# Patient Record
Sex: FEMALE | Race: WHITE | NOT HISPANIC OR LATINO | URBAN - METROPOLITAN AREA
[De-identification: names, ages, dates, MRNs, and addresses within clinical notes are randomized per-mention and may not be internally consistent; named-entity substitution may affect disease eponyms.]

---

## 2021-06-09 ENCOUNTER — OFFICE VISIT (OUTPATIENT)
Dept: SLEEP MEDICINE | Facility: CLINIC | Age: 51
End: 2021-06-09
Payer: OTHER GOVERNMENT

## 2021-06-09 VITALS
HEART RATE: 70 BPM | SYSTOLIC BLOOD PRESSURE: 118 MMHG | DIASTOLIC BLOOD PRESSURE: 73 MMHG | HEIGHT: 61 IN | BODY MASS INDEX: 26.22 KG/M2 | WEIGHT: 138.88 LBS

## 2021-06-09 DIAGNOSIS — R06.83 SNORING: ICD-10-CM

## 2021-06-09 DIAGNOSIS — G47.10 HYPERSOMNIA: Primary | ICD-10-CM

## 2021-06-09 DIAGNOSIS — G47.30 SLEEP APNEA, UNSPECIFIED TYPE: ICD-10-CM

## 2021-06-09 PROCEDURE — 99999 PR PBB SHADOW E&M-NEW PATIENT-LVL III: ICD-10-PCS | Mod: PBBFAC,,, | Performed by: INTERNAL MEDICINE

## 2021-06-09 PROCEDURE — 99999 PR PBB SHADOW E&M-NEW PATIENT-LVL III: CPT | Mod: PBBFAC,,, | Performed by: INTERNAL MEDICINE

## 2021-06-09 PROCEDURE — 99203 OFFICE O/P NEW LOW 30 MIN: CPT | Mod: S$PBB,,, | Performed by: INTERNAL MEDICINE

## 2021-06-09 PROCEDURE — 99203 OFFICE O/P NEW LOW 30 MIN: CPT | Mod: PBBFAC | Performed by: INTERNAL MEDICINE

## 2021-06-09 PROCEDURE — 99203 PR OFFICE/OUTPT VISIT, NEW, LEVL III, 30-44 MIN: ICD-10-PCS | Mod: S$PBB,,, | Performed by: INTERNAL MEDICINE

## 2021-06-09 RX ORDER — VALACYCLOVIR HYDROCHLORIDE 1 G/1
4000 TABLET, FILM COATED ORAL ONCE
COMMUNITY
Start: 2021-05-27

## 2021-06-17 ENCOUNTER — PATIENT MESSAGE (OUTPATIENT)
Dept: SLEEP MEDICINE | Facility: CLINIC | Age: 51
End: 2021-06-17

## 2021-06-28 ENCOUNTER — TELEPHONE (OUTPATIENT)
Dept: SLEEP MEDICINE | Facility: OTHER | Age: 51
End: 2021-06-28

## 2021-07-20 ENCOUNTER — TELEPHONE (OUTPATIENT)
Dept: SLEEP MEDICINE | Facility: OTHER | Age: 51
End: 2021-07-20

## 2021-07-21 ENCOUNTER — HOSPITAL ENCOUNTER (OUTPATIENT)
Dept: SLEEP MEDICINE | Facility: OTHER | Age: 51
Discharge: HOME | End: 2021-07-21
Attending: INTERNAL MEDICINE
Payer: OTHER GOVERNMENT

## 2021-07-21 DIAGNOSIS — G47.30 SLEEP APNEA, UNSPECIFIED TYPE: ICD-10-CM

## 2021-07-21 DIAGNOSIS — G47.10 HYPERSOMNIA: ICD-10-CM

## 2021-07-21 DIAGNOSIS — G47.33 OSA (OBSTRUCTIVE SLEEP APNEA): Primary | ICD-10-CM

## 2021-07-21 DIAGNOSIS — R06.83 SNORING: ICD-10-CM

## 2021-07-21 PROCEDURE — 95810 POLYSOM 6/> YRS 4/> PARAM: CPT | Mod: 26,,, | Performed by: INTERNAL MEDICINE

## 2021-07-21 PROCEDURE — 95810 POLYSOM 6/> YRS 4/> PARAM: CPT

## 2021-07-21 PROCEDURE — 95810 PR POLYSOMNOGRAPHY, 4 OR MORE: ICD-10-PCS | Mod: 26,,, | Performed by: INTERNAL MEDICINE

## 2021-07-28 ENCOUNTER — PATIENT MESSAGE (OUTPATIENT)
Dept: SLEEP MEDICINE | Facility: CLINIC | Age: 51
End: 2021-07-28

## 2021-07-28 DIAGNOSIS — G47.10 HYPERSOMNIA: ICD-10-CM

## 2021-07-28 DIAGNOSIS — G47.33 OSA (OBSTRUCTIVE SLEEP APNEA): Primary | ICD-10-CM

## 2021-07-29 ENCOUNTER — PATIENT MESSAGE (OUTPATIENT)
Dept: SLEEP MEDICINE | Facility: CLINIC | Age: 51
End: 2021-07-29

## 2021-08-12 ENCOUNTER — PATIENT MESSAGE (OUTPATIENT)
Dept: SLEEP MEDICINE | Facility: CLINIC | Age: 51
End: 2021-08-12

## 2021-09-17 ENCOUNTER — PATIENT MESSAGE (OUTPATIENT)
Dept: SLEEP MEDICINE | Facility: CLINIC | Age: 51
End: 2021-09-17

## 2021-10-08 ENCOUNTER — PATIENT MESSAGE (OUTPATIENT)
Dept: SLEEP MEDICINE | Facility: CLINIC | Age: 51
End: 2021-10-08

## 2021-10-08 DIAGNOSIS — G47.33 OSA (OBSTRUCTIVE SLEEP APNEA): Primary | ICD-10-CM

## 2021-12-06 ENCOUNTER — PATIENT MESSAGE (OUTPATIENT)
Dept: SLEEP MEDICINE | Facility: CLINIC | Age: 51
End: 2021-12-06
Payer: OTHER GOVERNMENT

## 2021-12-06 ENCOUNTER — TELEPHONE (OUTPATIENT)
Dept: SLEEP MEDICINE | Facility: CLINIC | Age: 51
End: 2021-12-06
Payer: OTHER GOVERNMENT

## 2021-12-15 ENCOUNTER — OFFICE VISIT (OUTPATIENT)
Dept: OTOLARYNGOLOGY | Facility: CLINIC | Age: 51
End: 2021-12-15
Payer: OTHER GOVERNMENT

## 2021-12-15 ENCOUNTER — CLINICAL SUPPORT (OUTPATIENT)
Dept: AUDIOLOGY | Facility: CLINIC | Age: 51
End: 2021-12-15
Payer: OTHER GOVERNMENT

## 2021-12-15 VITALS — SYSTOLIC BLOOD PRESSURE: 107 MMHG | DIASTOLIC BLOOD PRESSURE: 68 MMHG | HEART RATE: 72 BPM

## 2021-12-15 DIAGNOSIS — J35.8 TONSILLITH: ICD-10-CM

## 2021-12-15 DIAGNOSIS — J31.0 OTHER RHINITIS: Primary | ICD-10-CM

## 2021-12-15 DIAGNOSIS — H93.293 ABNORMAL AUDITORY PERCEPTION OF BOTH EARS: Primary | ICD-10-CM

## 2021-12-15 DIAGNOSIS — H93.13 TINNITUS OF BOTH EARS: ICD-10-CM

## 2021-12-15 DIAGNOSIS — R09.81 NASAL CONGESTION: ICD-10-CM

## 2021-12-15 DIAGNOSIS — H69.93 EUSTACHIAN TUBE DYSFUNCTION, BILATERAL: ICD-10-CM

## 2021-12-15 DIAGNOSIS — G47.33 OSA (OBSTRUCTIVE SLEEP APNEA): ICD-10-CM

## 2021-12-15 PROCEDURE — 92567 TYMPANOMETRY: CPT | Mod: PBBFAC | Performed by: AUDIOLOGIST

## 2021-12-15 PROCEDURE — 99204 OFFICE O/P NEW MOD 45 MIN: CPT | Mod: S$PBB,,, | Performed by: OTOLARYNGOLOGY

## 2021-12-15 PROCEDURE — 99999 PR PBB SHADOW E&M-EST. PATIENT-LVL III: CPT | Mod: PBBFAC,,, | Performed by: OTOLARYNGOLOGY

## 2021-12-15 PROCEDURE — 99204 PR OFFICE/OUTPT VISIT, NEW, LEVL IV, 45-59 MIN: ICD-10-PCS | Mod: S$PBB,,, | Performed by: OTOLARYNGOLOGY

## 2021-12-15 PROCEDURE — 92557 COMPREHENSIVE HEARING TEST: CPT | Mod: PBBFAC | Performed by: AUDIOLOGIST

## 2021-12-15 PROCEDURE — 99999 PR PBB SHADOW E&M-EST. PATIENT-LVL III: ICD-10-PCS | Mod: PBBFAC,,, | Performed by: OTOLARYNGOLOGY

## 2021-12-15 PROCEDURE — 99213 OFFICE O/P EST LOW 20 MIN: CPT | Mod: PBBFAC | Performed by: OTOLARYNGOLOGY

## 2021-12-15 RX ORDER — FLUTICASONE PROPIONATE 50 MCG
2 SPRAY, SUSPENSION (ML) NASAL DAILY
Qty: 9.9 ML | Refills: 2 | Status: SHIPPED | OUTPATIENT
Start: 2021-12-15 | End: 2022-01-14

## 2021-12-15 RX ORDER — AZELASTINE 1 MG/ML
2 SPRAY, METERED NASAL 2 TIMES DAILY
Qty: 30 ML | Refills: 3 | Status: SHIPPED | OUTPATIENT
Start: 2021-12-15 | End: 2022-12-15

## 2021-12-20 ENCOUNTER — TELEPHONE (OUTPATIENT)
Dept: SLEEP MEDICINE | Facility: CLINIC | Age: 51
End: 2021-12-20
Payer: OTHER GOVERNMENT

## 2021-12-22 ENCOUNTER — OFFICE VISIT (OUTPATIENT)
Dept: SLEEP MEDICINE | Facility: CLINIC | Age: 51
End: 2021-12-22
Payer: OTHER GOVERNMENT

## 2021-12-22 DIAGNOSIS — G47.10 HYPERSOMNIA: ICD-10-CM

## 2021-12-22 DIAGNOSIS — G47.33 OSA (OBSTRUCTIVE SLEEP APNEA): Primary | ICD-10-CM

## 2021-12-22 PROCEDURE — 99212 OFFICE O/P EST SF 10 MIN: CPT | Mod: 95,,, | Performed by: INTERNAL MEDICINE

## 2021-12-22 PROCEDURE — 99212 PR OFFICE/OUTPT VISIT, EST, LEVL II, 10-19 MIN: ICD-10-PCS | Mod: 95,,, | Performed by: INTERNAL MEDICINE

## 2022-10-24 ENCOUNTER — OFFICE VISIT (OUTPATIENT)
Dept: ALLERGY | Facility: CLINIC | Age: 52
End: 2022-10-24
Payer: OTHER GOVERNMENT

## 2022-10-24 VITALS
WEIGHT: 141.19 LBS | DIASTOLIC BLOOD PRESSURE: 76 MMHG | SYSTOLIC BLOOD PRESSURE: 117 MMHG | BODY MASS INDEX: 26.66 KG/M2 | OXYGEN SATURATION: 96 % | TEMPERATURE: 98 F | HEART RATE: 69 BPM | HEIGHT: 61 IN

## 2022-10-24 DIAGNOSIS — J32.9 RHINOSINUSITIS: Primary | ICD-10-CM

## 2022-10-24 PROCEDURE — 99243 OFF/OP CNSLTJ NEW/EST LOW 30: CPT | Mod: S$GLB,,, | Performed by: ALLERGY & IMMUNOLOGY

## 2022-10-24 PROCEDURE — 99243 PR OFFICE CONSULTATION,LEVEL III: ICD-10-PCS | Mod: S$GLB,,, | Performed by: ALLERGY & IMMUNOLOGY

## 2022-10-24 RX ORDER — BUDESONIDE 0.5 MG/2ML
0.5 INHALANT ORAL DAILY
Qty: 120 ML | Refills: 3 | Status: SHIPPED | OUTPATIENT
Start: 2022-10-24 | End: 2023-10-24

## 2022-10-24 RX ORDER — OLOPATADINE HYDROCHLORIDE 1 MG/ML
SOLUTION/ DROPS OPHTHALMIC
COMMUNITY
Start: 2022-08-30

## 2022-10-24 RX ORDER — IBUPROFEN 800 MG/1
800 TABLET ORAL 3 TIMES DAILY
COMMUNITY
Start: 2022-05-06

## 2022-10-24 NOTE — PROGRESS NOTES
"Subjective:       Patient ID: Johana Lennon is a 51 y.o. female.    Chief Complaint: Allergic Rhinitis     HPI    Pt presents as   A new patient  For rhinitis     Onset: adulthood   Duration 25 yrs  Last allergy test 2012 - negative  Sx: watery, itching eyes, congestion, pnd, azelazstine - doesn't use.   Tx: pataday, saline, h1 otc, gargle salt water    Season: none  Trigger: wind, weather changes       Review of Systems    General: neg unexpected weight changes, fevers, chills, night sweats, malaise  HEENT: see hpi, Neg eye pain, vision changes, ear drainage, nose bleeds, throat tightness, sores in the mouth  CV: Neg chest pain, palpitations, swelling  Resp: see hpi, neg shortness of breath, hemoptysis, cough  GI: see hpi, neg dysphagia, night abdominal pain, reflux, chronic diarrhea, chronic constipation  Derm: See Hpi, neg new rash, neg flushing  Mu/sk: Neg joint pain, joint swelling   Psych: Neg anxiety  neuro: neg chronic headaches, muscle weakness  Endo: neg heat/cold intolerance, chronic fatigue    Objective:     Vitals:    10/24/22 1042   BP: 117/76   Pulse: 69   Temp: 97.9 °F (36.6 °C)   SpO2: 96%   Weight: 64 kg (141 lb 3.2 oz)   Height: 5' 1" (1.549 m)        Physical Exam    General: no acute distress, well developed well nourished   HEENT:   Head:normocephalic atraumatic  Eyes: YULY, EOMI, Neg injection, scleral icterus, or conjunctival papillary hypertrophy.  Ears: tm clear bilaterally, normal canal  Nose: 2-3+ inferior turbinates pink, neg nasal polyps            Mucosa: moist             Septal irritation: none   OP: mucus membranes moist, - cobblestoning, - PND, neg erythema or lesions  Neck: supple, Full range of motion, neg lymphadenopathy  Chest: full respiratory excursion no abnormal chest abnormality  Skin: Neg rashes or lesions      Assessment:       1. Rhinosinusitis        Plan:       Rhinosinusitis  -     budesonide (PULMICORT) 0.5 mg/2 mL nebulizer solution; Take 2 mLs (0.5 mg total) by " nebulization once daily. Controller  Dispense: 120 mL; Refill: 3          Rhinosinusitis  10/22:  Start budesonide into sinus rinses   Start alkolol prn   Start capsaicine nasal spray prn   Skin prick test inhalants    Bilateral conjunctivitis   10/22:  Continue pataday prn     Follow up in 4-6 weeks, sooner if needed      Diana Perry M.D.  Allergy/Immunology  [facility]   [phone] phone  [phone] fax

## 2022-10-24 NOTE — PATIENT INSTRUCTIONS
[employer] is the easiest may be about 15-18 dollars       Alkolol  Ingredients  Purified Water USP, Menthol, Eucalyptol, Thymol, Camphor, Benzoin, Wintergreen Oil, Spearmint (Mentha Viridis) Oil, Pine Oil, Cinnamon Oil, Potassium Alum, Potassium Chlorate, Sodium Bicarbonate (Baking Soda), Sodium Chloride, Alcohol, Caramel Color            Put distilled water into the estefani med sinus rinse bottle and stop at the black line. Then pour that amount into a microwavable mug, microwave 10 seconds then put into the distilled water back into the bottle.   Then place xlear packet into and mix. The packets that come with the estefani med can be used instead or kept for backup if you run out of xlear.      - over the counter, can buy online at [employer].        Then place budesonide- respules into mixture, this will replace the intranasal steroid spray. (flonase/fluticasone, rhinocort, nasacort)              Hold breath, squeeze GENTLY ! And blow out. Easiest probably in the shower.      Must be used daily or this will not be effective.     Use 2 vials daily or 1 vial twice per day.     If you feel worsening symptoms, try doubling the dose x 2 weeks, then back to the baseline dose.       Tips to avoid unexpected drainage after rinsing     In rare situations, especially if you have had sinus surgery, the saline solution can pool in the sinus cavities and nasal passages and then drip from your nostrils hours after rinsing. To avoid this harmless but annoying inconvenience, take one extra step after rinsing: lean forward, tilt your head sideways and gently blow your nose. Then, tilt your head to the other side and blow again. You may need to repeat this several times. This will help rid your nasal passages of any excess mucus and remaining saline solution. If you find yourself experiencing delayed drainage often, do not rinse right before leaving your house or going to bed.      Azelastine- use as needed for congestion and post  nasal drip. Use 1 spray per nare every 6 hours.    Head down  Aim nasal spray tip up and out   Spray DON'T sniff   Let the spray drip out the front  Pat dry and lift head.     Use antihistamines as needed for itching or sneezing.       Instructions For Skin Testing    Please HOLD all antihistamines (Benadryl, zyrtec, Claritin, loratidine, cetirizine, diphenhydramine, medications with pm in the name, Allegra, fexofenadine) 7 days prior to testing.     Please HOLD zantac, ranitidine, pepsid, famotidine 3 days prior to your testing.     Please HOLD azelastine, astelin, astepro, dymista three days prior to your skin testing- hold eye drops x 3 days prior     Please HOLD your Beta Blocker (ask the office if you are on one.) These medicines typically end in olol. HOLD the morning of skin testing.    Please HOLD clonidine the morning of skin testing.     Please HOLD any Tricyclic antidepressants 14 days prior to skin testing. Please consult your prescribing doctor prior to discontinuing this medication.     Please HOLD Seroquel 14 days prior to skin testing. Please consult your prescribing physician prior to stopping this medication.     After skin testing, you may resume taking your HELD medications.     You may CONTINUE Montelukast , Flonase, Fluticasone, Nasonex, or other intranasal steroid.     Follow up in 4-6 weeks, sooner if needed.

## 2022-10-24 NOTE — LETTER
October 24, 2022        Carlos Bowman MD  [address] [facility] [address]             [facility] - Allergy  [address]  [address]  [place] [address]  Phone: [phone]  Fax: [phone]   Patient: Johana Lennon   MR Number: [id number]   YOB: 1970   Date of Visit: 10/24/2022       Dear Dr. Bowman:    Thank you for referring Johana Lennon to me for evaluation. Attached you will find relevant portions of my assessment and plan of care.    If you have questions, please do not hesitate to call me. I look forward to following Johana Lennon along with you.    Sincerely,      Diana Perry MD            CC  No Recipients    Enclosure

## 2023-01-09 ENCOUNTER — CLINICAL SUPPORT (OUTPATIENT)
Dept: ALLERGY | Facility: CLINIC | Age: 53
End: 2023-01-09
Payer: OTHER GOVERNMENT

## 2023-01-09 ENCOUNTER — OFFICE VISIT (OUTPATIENT)
Dept: ALLERGY | Facility: CLINIC | Age: 53
End: 2023-01-09
Payer: OTHER GOVERNMENT

## 2023-01-09 VITALS
DIASTOLIC BLOOD PRESSURE: 71 MMHG | OXYGEN SATURATION: 97 % | TEMPERATURE: 97 F | TEMPERATURE: 97 F | BODY MASS INDEX: 28.08 KG/M2 | WEIGHT: 148.63 LBS | HEART RATE: 73 BPM | OXYGEN SATURATION: 97 % | SYSTOLIC BLOOD PRESSURE: 117 MMHG | SYSTOLIC BLOOD PRESSURE: 117 MMHG | HEART RATE: 73 BPM | BODY MASS INDEX: 28.08 KG/M2 | DIASTOLIC BLOOD PRESSURE: 71 MMHG | WEIGHT: 148.63 LBS

## 2023-01-09 DIAGNOSIS — J32.9 RHINOSINUSITIS: Primary | ICD-10-CM

## 2023-01-09 DIAGNOSIS — J30.89 NON-SEASONAL ALLERGIC RHINITIS DUE TO OTHER ALLERGIC TRIGGER: Primary | ICD-10-CM

## 2023-01-09 PROCEDURE — 95004 PR ALLERGY SKIN TESTS,ALLERGENS: ICD-10-PCS | Mod: S$GLB,,, | Performed by: ALLERGY & IMMUNOLOGY

## 2023-01-09 PROCEDURE — 99213 OFFICE O/P EST LOW 20 MIN: CPT | Mod: S$GLB,,, | Performed by: ALLERGY & IMMUNOLOGY

## 2023-01-09 PROCEDURE — 95004 PERQ TESTS W/ALRGNC XTRCS: CPT | Mod: S$GLB,,, | Performed by: ALLERGY & IMMUNOLOGY

## 2023-01-09 PROCEDURE — 99213 PR OFFICE/OUTPT VISIT, EST, LEVL III, 20-29 MIN: ICD-10-PCS | Mod: S$GLB,,, | Performed by: ALLERGY & IMMUNOLOGY

## 2023-01-09 NOTE — LETTER
January 9, 2023        Carlos Bowman MD  [address] [place] [address]             [facility] - Allergy  [address]  [address]  [place] [address]  Phone: [phone]  Fax: [phone]   Patient: Johana Lennon   MR Number: [id number]   YOB: 1970   Date of Visit: 1/9/2023       Dear Dr. Bowman:    Thank you for referring Johana Lennon to me for evaluation. Below are the relevant portions of my assessment and plan of care.            If you have questions, please do not hesitate to call me. I look forward to following Johana along with you.    Sincerely,      Diana Perry MD           CC  No Recipients

## 2023-01-09 NOTE — PATIENT INSTRUCTIONS
- buy over the counter at any pharmacy or [employer]     Put distilled water into the estefani med sinus rinse bottle and stop at the black line.     Then, pour that amount into a microwavable mug, microwave 10 seconds then put into the distilled water back into the bottle.     Then place xlear packet into and mix. The packets that come with the estefani med can be used instead or kept for backup if you run out of xlear.      - buy over the counter, can buy online at [employer].        Then place budesonide vial into distilled water and xlear/salt packet mixture.    This will replace the intranasal steroid spray. (flonase/fluticasone, rhinocort, nasacort) unless you want to use both.       - budesonide vials     Breathe in via mouth. Hold breath, squeeze GENTLY ! And blow out via the nose.      The shower is a popular location. Steam can also help open the sinuses.       Budesonide vials Must be used daily for at least 2-4 weeks, or this will not be effective.     Dose:  Use 2 vials daily or 1 vial twice per day.     If you feel worsening symptoms, try doubling the dose x 2 weeks, then back to the baseline or original dose.     If there is a burning sensation, add baking soda to neutralize the ph. Make sure the ratios are mixed correctly.     Tips to avoid unexpected drainage after rinsing     In rare situations, especially if you have had sinus surgery, the saline solution can pool in the sinus cavities and nasal passages and then drip from your nostrils hours after rinsing. To avoid this harmless, but annoying inconvenience, take one extra step after rinsing: lean forward, tilt your head sideways and gently blow your nose. Then, tilt your head to the other side and blow again. You may need to repeat this several times. This will help rid your nasal passages of any excess mucus and remaining saline solution. If you find yourself experiencing delayed drainage often, do not rinse right before leaving your house or going  to bed.      Oral antihistamines  Use antihistamines as needed for itching or sneezing.   Daily use can thicken the mucus to where it becomes a glue-like consistency.    Continue alkolol when needed.     Air purifyer - honeywell   Replace filter every so often     Look at pancake anaphylaxis consider putting flour into the freezer.     You can consider capsaicin nasal spray if needed, for congestion. As needed.     Continue pataday when needed.     Follow up 6-12 months, sooner if needed

## 2023-01-09 NOTE — PROGRESS NOTES
Subjective:       Patient ID: Johana Lennon is a 52 y.o. female.    Chief Complaint: Allergic Rhinitis  (Skin test review)      HPI    Pt presents as   for rhinitis     Allergic     Skin prick  test 1/23: dust , cat pigweed    Rinses with alkolol not tried bduesonide.   Pataday drops help     Onset: adulthood   Duration 25 yrs  Last allergy test 2012 - negative  Sx: watery, itching eyes, congestion, pnd, azelazstine - doesn't use.   Tx: pataday, saline, h1 otc, gargle salt water    Season: none  Trigger: wind, weather changes           General: neg unexpected weight changes, fevers, chills, night sweats, malaise  HEENT: see hpi, Neg eye pain, vision changes, ear drainage, nose bleeds, throat tightness, sores in the mouth  CV: Neg chest pain, palpitations, swelling  Resp: see hpi, neg shortness of breath, hemoptysis, cough  GI: see hpi, neg dysphagia, night abdominal pain, reflux, chronic diarrhea, chronic constipation  Derm: See Hpi, neg new rash, neg flushing  Mu/sk: Neg joint pain, joint swelling   Psych: Neg anxiety  neuro: neg chronic headaches, muscle weakness  Endo: neg heat/cold intolerance, chronic fatigue    Objective:     Vitals:    01/09/23 1445   BP: 117/71   Pulse: 73   Temp: 97.3 °F (36.3 °C)   SpO2: 97%   Weight: 67.4 kg (148 lb 9.6 oz)        Physical Exam    General: no acute distress, well developed well nourished       Assessment:       1. Rhinosinusitis          Plan:       Rhinosinusitis              Rhinosinusitis  1/23:  Start budesonide rinses  Continue alkolol  Consider capsaicin       10/22:  Start budesonide into sinus rinses   Start alkolol prn   Start capsaicine nasal spray prn   Skin prick test inhalants    Bilateral conjunctivitis   1/23:  Pataday continue bid prn     10/22:  Continue pataday prn     Follow up in 6-12 months, sooner if needed      Diana Perry M.D.  Allergy/Immunology  [facility]   [phone] phone  [phone] fax

## 2023-07-07 DIAGNOSIS — R52 PAIN: Primary | ICD-10-CM

## 2023-07-12 ENCOUNTER — OFFICE VISIT (OUTPATIENT)
Dept: ORTHOPEDICS | Facility: CLINIC | Age: 53
End: 2023-07-12
Payer: OTHER GOVERNMENT

## 2023-07-12 ENCOUNTER — HOSPITAL ENCOUNTER (OUTPATIENT)
Dept: RADIOLOGY | Facility: HOSPITAL | Age: 53
Discharge: HOME | End: 2023-07-12
Attending: ORTHOPAEDIC SURGERY
Payer: OTHER GOVERNMENT

## 2023-07-12 VITALS
HEART RATE: 59 BPM | BODY MASS INDEX: 27.43 KG/M2 | DIASTOLIC BLOOD PRESSURE: 75 MMHG | WEIGHT: 145.31 LBS | SYSTOLIC BLOOD PRESSURE: 132 MMHG | HEIGHT: 61 IN

## 2023-07-12 DIAGNOSIS — M25.552 PAIN OF LEFT HIP: Primary | ICD-10-CM

## 2023-07-12 DIAGNOSIS — R52 PAIN: ICD-10-CM

## 2023-07-12 PROCEDURE — 73502 X-RAY EXAM HIP UNI 2-3 VIEWS: CPT | Mod: 26,LT,, | Performed by: RADIOLOGY

## 2023-07-12 PROCEDURE — 73502 X-RAY EXAM HIP UNI 2-3 VIEWS: CPT | Mod: TC,PN,LT

## 2023-07-12 PROCEDURE — 99204 OFFICE O/P NEW MOD 45 MIN: CPT | Mod: S$PBB,,, | Performed by: ORTHOPAEDIC SURGERY

## 2023-07-12 PROCEDURE — 73502 XR HIP WITH PELVIS WHEN PERFORMED, 2 OR 3 VIEWS LEFT: ICD-10-PCS | Mod: 26,LT,, | Performed by: RADIOLOGY

## 2023-07-12 PROCEDURE — 99999 PR PBB SHADOW E&M-EST. PATIENT-LVL IV: ICD-10-PCS | Mod: PBBFAC,,, | Performed by: ORTHOPAEDIC SURGERY

## 2023-07-12 PROCEDURE — 99999 PR PBB SHADOW E&M-EST. PATIENT-LVL IV: CPT | Mod: PBBFAC,,, | Performed by: ORTHOPAEDIC SURGERY

## 2023-07-12 PROCEDURE — 99214 OFFICE O/P EST MOD 30 MIN: CPT | Mod: PBBFAC,PN | Performed by: ORTHOPAEDIC SURGERY

## 2023-07-12 PROCEDURE — 99204 PR OFFICE/OUTPT VISIT, NEW, LEVL IV, 45-59 MIN: ICD-10-PCS | Mod: S$PBB,,, | Performed by: ORTHOPAEDIC SURGERY

## 2023-07-12 RX ORDER — FLUTICASONE PROPIONATE 50 MCG
100 SPRAY, SUSPENSION (ML) NASAL
COMMUNITY
Start: 2023-06-30 | End: 2024-06-24

## 2023-07-12 RX ORDER — ACYCLOVIR 50 MG/G
CREAM TOPICAL
COMMUNITY
Start: 2023-06-30

## 2023-07-12 NOTE — PROGRESS NOTES
Patient ID:   Johana Lennon is a 52 y.o. female.    Chief Complaint:   Left hip pain    HPI:   The patient is a 52-year-old female who is presenting with left hip pain.  Pain intensity is 6/10.  She has pain in multiple regions.  First, she has pain in her groin area.  Second she has pain in her posterolateral aspect of the hip.  She reports a history of injuring the groin 20+ years ago when she was using an ab strengthening machine.  She states that she had it looked at at the time and was told that she had a mass in the groin area.  Over the last 20 years she reports having some pain in the left groin region.  Recently the pain has increased.  The lateral pain does radiate down the lateral aspect of the thigh to the level of the knee.  It does not go past the knee.  She does admit to having some numbness and paresthesias in both of her feet and both of her hands.  She is a history of lumbar disc disease.  Treatment has included ibuprofen 500s.    Medications:    Current Outpatient Medications:     acyclovir 5% (ZOVIRAX) 5 % Crea, Apply topically 5 (five) times daily., Disp: , Rfl:     budesonide (PULMICORT) 0.5 mg/2 mL nebulizer solution, Take 2 mLs (0.5 mg total) by nebulization once daily. Controller, Disp: 120 mL, Rfl: 3    fluticasone propionate (FLONASE) 50 mcg/actuation nasal spray, 100 mcg by Nasal route., Disp: , Rfl:     ibuprofen (ADVIL,MOTRIN) 800 MG tablet, Take 800 mg by mouth 3 (three) times daily., Disp: , Rfl:     olopatadine (PATANOL) 0.1 % ophthalmic solution, , Disp: , Rfl:     valACYclovir (VALTREX) 1000 MG tablet, Take 4,000 mg by mouth once., Disp: , Rfl:     azelastine (ASTELIN) 137 mcg (0.1 %) nasal spray, 2 sprays (274 mcg total) by Nasal route 2 (two) times daily., Disp: 30 mL, Rfl: 3    Allergies:  Review of patient's allergies indicates:   Allergen Reactions    House dust mite Anxiety, Itching, Swelling and Tinitus       Past Medical History:  History reviewed. No pertinent past  "medical history.     Past Surgical History:  History reviewed. No pertinent surgical history.    Social History:  Social History     Occupational History    Not on file   Tobacco Use    Smoking status: Never    Smokeless tobacco: Never   Substance and Sexual Activity    Alcohol use: Not Currently    Drug use: Not on file    Sexual activity: Not on file       Family History:  History reviewed. No pertinent family history.     ROS:  Review of Systems   Musculoskeletal:  Positive for back pain, joint pain, muscle weakness and myalgias.   Neurological:  Positive for numbness and paresthesias.   All other systems reviewed and are negative.    Vitals:  /75   Pulse (!) 59   Ht 5' 1" (1.549 m)   Wt 65.9 kg (145 lb 4.5 oz)   BMI 27.45 kg/m²     Physical Examination:  Comprehensive Orthopaedic Musculoskeletal Exam    General      Constitutional: appears stated age, well-developed and well-nourished    Scleral icterus: no    Labored breathing: no    Psychiatric: normal mood and affect and no acute distress    Neurological: alert and oriented x3    Skin: intact    Lymphadenopathy: none   Ortho Exam   Left hip exam:   Equal leg lengths by apparent and true measurement.    Nonantalgic gait.    Negative Trendelenburg.    Positive Stinchfield.    Negative ipsilateral and contralateral straight leg raise.    Range of motion in the left hip is normal.    Positive FADDIR.  Negative ENRIKE for SI joint pain.  Trochanteric tenderness.  Positive Jelani's test.  Weakness with testing of the abductors with 4+ out of 5 strength.  Weakness with resisted hip adduction.  Pain with passive hip abduction.  Pain with a crunch test.    Imaging:  I have ordered and independently reviewed the following imaging studies performed at [facility] today    X-Ray Hip 2 or 3 views Left (with Pelvis when performed)  Narrative: EXAMINATION:  XR HIP WITH PELVIS WHEN PERFORMED, 2 OR 3 VIEWS LEFT    CLINICAL HISTORY:  Pain, unspecified    TECHNIQUE:  AP view " of the pelvis and frog leg lateral view of the left hip were performed.    COMPARISON:  None    FINDINGS:  No acute fractures.  Degenerative changes lower lumbar spine.  Intact right and left SI joints.  Question minimal narrowing of right and left superolateral hip joint spaces and minimal acetabular roof spurring.  Preserved right and left femoral head contours.  Impression: As above    Electronically signed by: Jignesh Lucas  Date:    07/12/2023  Time:    09:10    Assessment:  1. Pain of left hip      Plan:  The patient has had over 20 year history of pain in her left hip.  I discussed the differential diagnosis.  I would expect an adductor strain would have gotten better by now.  The mass that she is referring to could be scar tissue from her prior adductor strain.  I have also discussed the possibility of her having a labral tear.  I have recommended MRI scan of the left hip to further evaluate.  She will return after the MRI scan is completed.    Orders Placed This Encounter    MRI Hip Without Contrast Left     No follow-ups on file.

## 2023-07-13 ENCOUNTER — OFFICE VISIT (OUTPATIENT)
Dept: PODIATRY | Facility: CLINIC | Age: 53
End: 2023-07-13
Payer: OTHER GOVERNMENT

## 2023-07-13 ENCOUNTER — TELEPHONE (OUTPATIENT)
Dept: PODIATRY | Facility: CLINIC | Age: 53
End: 2023-07-13

## 2023-07-13 ENCOUNTER — HOSPITAL ENCOUNTER (OUTPATIENT)
Dept: RADIOLOGY | Facility: HOSPITAL | Age: 53
Discharge: HOME | End: 2023-07-13
Attending: PODIATRIST
Payer: OTHER GOVERNMENT

## 2023-07-13 VITALS
WEIGHT: 145.31 LBS | SYSTOLIC BLOOD PRESSURE: 111 MMHG | HEART RATE: 62 BPM | BODY MASS INDEX: 27.43 KG/M2 | HEIGHT: 61 IN | DIASTOLIC BLOOD PRESSURE: 69 MMHG

## 2023-07-13 DIAGNOSIS — M25.571 CHRONIC ANKLE PAIN, BILATERAL: ICD-10-CM

## 2023-07-13 DIAGNOSIS — R29.898 WEAKNESS OF LEFT FOOT: ICD-10-CM

## 2023-07-13 DIAGNOSIS — M25.572 CHRONIC ANKLE PAIN, BILATERAL: ICD-10-CM

## 2023-07-13 DIAGNOSIS — G89.29 CHRONIC ANKLE PAIN, BILATERAL: ICD-10-CM

## 2023-07-13 DIAGNOSIS — R20.2 NUMBNESS AND TINGLING OF FOOT: ICD-10-CM

## 2023-07-13 DIAGNOSIS — R26.89 IMBALANCE: ICD-10-CM

## 2023-07-13 DIAGNOSIS — G89.29 CHRONIC ANKLE PAIN, BILATERAL: Primary | ICD-10-CM

## 2023-07-13 DIAGNOSIS — R20.0 NUMBNESS AND TINGLING OF FOOT: ICD-10-CM

## 2023-07-13 DIAGNOSIS — M25.571 CHRONIC ANKLE PAIN, BILATERAL: Primary | ICD-10-CM

## 2023-07-13 DIAGNOSIS — M25.572 CHRONIC ANKLE PAIN, BILATERAL: Primary | ICD-10-CM

## 2023-07-13 PROCEDURE — 99999 PR PBB SHADOW E&M-EST. PATIENT-LVL IV: CPT | Mod: PBBFAC,,, | Performed by: PODIATRIST

## 2023-07-13 PROCEDURE — 99203 OFFICE O/P NEW LOW 30 MIN: CPT | Mod: S$PBB,,, | Performed by: PODIATRIST

## 2023-07-13 PROCEDURE — 73610 XR ANKLE COMPLETE 3 VIEW BILATERAL: ICD-10-PCS | Mod: 26,50,, | Performed by: RADIOLOGY

## 2023-07-13 PROCEDURE — 73630 X-RAY EXAM OF FOOT: CPT | Mod: TC,50

## 2023-07-13 PROCEDURE — 99203 PR OFFICE/OUTPT VISIT, NEW, LEVL III, 30-44 MIN: ICD-10-PCS | Mod: S$PBB,,, | Performed by: PODIATRIST

## 2023-07-13 PROCEDURE — 73610 X-RAY EXAM OF ANKLE: CPT | Mod: 26,50,, | Performed by: RADIOLOGY

## 2023-07-13 PROCEDURE — 73610 X-RAY EXAM OF ANKLE: CPT | Mod: TC,50

## 2023-07-13 PROCEDURE — 99214 OFFICE O/P EST MOD 30 MIN: CPT | Mod: PBBFAC | Performed by: PODIATRIST

## 2023-07-13 PROCEDURE — 99999 PR PBB SHADOW E&M-EST. PATIENT-LVL IV: ICD-10-PCS | Mod: PBBFAC,,, | Performed by: PODIATRIST

## 2023-07-13 PROCEDURE — 73630 XR FOOT COMPLETE 3 VIEW BILATERAL: ICD-10-PCS | Mod: 26,50,, | Performed by: RADIOLOGY

## 2023-07-13 PROCEDURE — 73630 X-RAY EXAM OF FOOT: CPT | Mod: 26,50,, | Performed by: RADIOLOGY

## 2023-07-13 NOTE — PROGRESS NOTES
"Subjective:     Patient ID: Johana Lennon is a 52 y.o. female.    Chief Complaint: Foot Pain (Bilateral/Stiffness/Numbness)    Johana is a 52 y.o. female who presents to the podiatry clinic  with complaint of  bilateral ankle pain and left foot weakness. Has 20 year hx of fibular sesamoidectomy and since then she has had ongoing balance problems and now also having numbness/burning in feet L>R and weakness of the left foot to the point that she feels she is dragging the foot and sometimes feels that she may trip on the foot. Onset of the symptoms was several years ago. Precipitating event: none known. Current symptoms include: ability to bear weight, but with some pain and weakness of ankles L>R . Aggravating factors: standing and walking. Symptoms have progressed to a point and plateaued. Patient has had no prior foot problems. Evaluation to date: none. Treatment to date: none. Patients rates pain 0/10 on pain scale.    Vitals:    07/13/23 0932   BP: 111/69   Pulse: 62   Weight: 65.9 kg (145 lb 4.5 oz)   Height: 5' 1" (1.549 m)   PainSc: 0-No pain       Review of Systems   Constitutional: Negative for chills and fever.   Cardiovascular:  Negative for chest pain, claudication and leg swelling.   Respiratory:  Negative for cough and shortness of breath.    Skin:  Negative for dry skin and nail changes.   Musculoskeletal:  Positive for arthritis, back pain, joint pain and muscle weakness (left foot weakness).   Gastrointestinal:  Negative for nausea and vomiting.   Neurological:  Positive for numbness, paresthesias and sensory change.   Psychiatric/Behavioral:  Negative for altered mental status.       Objective:     Physical Exam  Vitals reviewed.   Constitutional:       Appearance: She is well-developed.   HENT:      Head: Normocephalic.   Cardiovascular:      Pulses:           Dorsalis pedis pulses are 2+ on the right side and 2+ on the left side.        Posterior tibial pulses are 2+ on the right side and 2+ on " the left side.      Comments: DP and PT pulses are 2/4 bilaterally.        Pulmonary:      Effort: No respiratory distress.   Musculoskeletal:      Right lower leg: No edema.      Left lower leg: No edema.      Comments: General diffuse pain with palpation of b/l ankles along lateral gutter. No palpable or visible deformity noted.     MMT 5/5 R 4+/5 L in DF/PF/Inv/Ev resistance with no reproduction of pain in any direction b/l.     Hypermobility noted to 1st ray b/l with near complete collapse of medial longitudinal arch b/l with loading.     Equinus contracture noted to b/l ankles with knee straight and slightly improved with knee bent.      Skin:     General: Skin is warm and dry.      Findings: No erythema.      Comments: No open lesions, lacerations or wounds noted. Nails are normal to R 1-5 and L 1-5. Interdigital spaces clean, dry and intact b/l. No erythema noted to b/l foot. Skin texture normal. Pedal hair normal     Neurological:      Mental Status: She is alert and oriented to person, place, and time.      Sensory: Sensory deficit present.      Comments: Light touch, proprioception, and sharp/dull sensation are all intact bilaterally. Subjective paresthesias with no clearly identifiable source or trigger.    Psychiatric:         Behavior: Behavior normal.         Thought Content: Thought content normal.         Judgment: Judgment normal.         Assessment:      Encounter Diagnoses   Name Primary?    Chronic ankle pain, bilateral Yes    Weakness of left foot     Imbalance     Numbness and tingling of foot      Plan:     Johana was seen today for foot pain.    Diagnoses and all orders for this visit:    Chronic ankle pain, bilateral  -     EMG W/ ULTRASOUND AND NERVE CONDUCTION TEST 2 Extremities; Future  -     X-Ray Foot Complete Bilateral; Future  -     X-Ray Ankle Complete 3 View Bilateral; Future    Weakness of left foot  -     EMG W/ ULTRASOUND AND NERVE CONDUCTION TEST 2 Extremities; Future  -      X-Ray Foot Complete Bilateral; Future  -     X-Ray Ankle Complete 3 View Bilateral; Future    Imbalance  -     EMG W/ ULTRASOUND AND NERVE CONDUCTION TEST 2 Extremities; Future    Numbness and tingling of foot  -     EMG W/ ULTRASOUND AND NERVE CONDUCTION TEST 2 Extremities; Future      I counseled the patient on her conditions, their implications and medical management.    Xray ordered b/l ankle and foot to assess for any obvious bony deformity, injury.     EMG/NCS ordered for further assessment of nerve function.     Long discussion with patient regarding appropriate, supportive and comfortable shoes. Recommended supportive style shoe brands with adequate arch supports to alleviate abnormal pressure and improve stability of foot while walking. Avoid flat shoes and barefoot walking as these will exacerbate or worsen symptoms.     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     RTC after EMG, sooner PRN

## 2023-07-13 NOTE — TELEPHONE ENCOUNTER
Left vm message for patient in regards to her x-ray results per: Dr. Rodriguez(Podiatry)          ----- Message from Mark Rodriguez DPM sent at 7/13/2023  1:00 PM CDT -----  Please call her and let her know that her xrays showed mild wear and tear around the midfoot and big toe joints as well as an old fracture or extra bone in her right ankle. No major fractures or other issues were noted. Continue with current plan and follow up after nerve conduction study. Thanks.

## 2023-07-17 ENCOUNTER — PATIENT MESSAGE (OUTPATIENT)
Dept: PODIATRY | Facility: CLINIC | Age: 53
End: 2023-07-17
Payer: OTHER GOVERNMENT

## 2023-07-30 ENCOUNTER — TELEPHONE (OUTPATIENT)
Dept: SPORTS MEDICINE | Facility: CLINIC | Age: 53
End: 2023-07-30
Payer: OTHER GOVERNMENT

## 2023-07-30 NOTE — TELEPHONE ENCOUNTER
LVM with patient. Rescheduled appointment to 2:45 pm tomorrow.     Shae Cage ATC, OTC  Sports Medicine Assistant - Dr. Erasmo Tate   [facility] [place]

## 2023-07-31 ENCOUNTER — OFFICE VISIT (OUTPATIENT)
Dept: SPORTS MEDICINE | Facility: CLINIC | Age: 53
End: 2023-07-31
Payer: OTHER GOVERNMENT

## 2023-07-31 ENCOUNTER — HOSPITAL ENCOUNTER (OUTPATIENT)
Dept: RADIOLOGY | Facility: HOSPITAL | Age: 53
Discharge: HOME | End: 2023-07-31
Attending: ORTHOPAEDIC SURGERY
Payer: OTHER GOVERNMENT

## 2023-07-31 ENCOUNTER — TELEPHONE (OUTPATIENT)
Dept: ORTHOPEDICS | Facility: CLINIC | Age: 53
End: 2023-07-31
Payer: OTHER GOVERNMENT

## 2023-07-31 VITALS
WEIGHT: 145.75 LBS | SYSTOLIC BLOOD PRESSURE: 106 MMHG | HEART RATE: 54 BPM | HEIGHT: 61 IN | DIASTOLIC BLOOD PRESSURE: 66 MMHG | BODY MASS INDEX: 27.52 KG/M2

## 2023-07-31 DIAGNOSIS — M25.562 PAIN IN BOTH KNEES, UNSPECIFIED CHRONICITY: ICD-10-CM

## 2023-07-31 DIAGNOSIS — M22.2X1 PATELLOFEMORAL PAIN SYNDROME OF BOTH KNEES: Primary | ICD-10-CM

## 2023-07-31 DIAGNOSIS — M22.2X2 PATELLOFEMORAL PAIN SYNDROME OF BOTH KNEES: Primary | ICD-10-CM

## 2023-07-31 DIAGNOSIS — M25.561 PAIN IN BOTH KNEES, UNSPECIFIED CHRONICITY: ICD-10-CM

## 2023-07-31 PROCEDURE — 73564 X-RAY EXAM KNEE 4 OR MORE: CPT | Mod: TC,50

## 2023-07-31 PROCEDURE — 99999 PR PBB SHADOW E&M-EST. PATIENT-LVL IV: ICD-10-PCS | Mod: PBBFAC,,, | Performed by: ORTHOPAEDIC SURGERY

## 2023-07-31 PROCEDURE — 99204 PR OFFICE/OUTPT VISIT, NEW, LEVL IV, 45-59 MIN: ICD-10-PCS | Mod: S$PBB,,, | Performed by: ORTHOPAEDIC SURGERY

## 2023-07-31 PROCEDURE — 99204 OFFICE O/P NEW MOD 45 MIN: CPT | Mod: S$PBB,,, | Performed by: ORTHOPAEDIC SURGERY

## 2023-07-31 PROCEDURE — 99999 PR PBB SHADOW E&M-EST. PATIENT-LVL IV: CPT | Mod: PBBFAC,,, | Performed by: ORTHOPAEDIC SURGERY

## 2023-07-31 PROCEDURE — 99214 OFFICE O/P EST MOD 30 MIN: CPT | Mod: PBBFAC | Performed by: ORTHOPAEDIC SURGERY

## 2023-07-31 PROCEDURE — 73564 X-RAY EXAM KNEE 4 OR MORE: CPT | Mod: 26,50,, | Performed by: RADIOLOGY

## 2023-07-31 PROCEDURE — 73564 XR KNEE ORTHO BILAT WITH FLEXION: ICD-10-PCS | Mod: 26,50,, | Performed by: RADIOLOGY

## 2023-07-31 NOTE — PROGRESS NOTES
CC: Bilateral knee pain    52 y.o. Female who presents as a new patient to me. She is [employer]. Complaint of chronic, anterior and retropatellar bilateral knee pain for 10 years. Endorses intermittent swelling. No prominent mechanical symptoms. Endorses groin pain for which she recently saw Dr. Carlos Newell currently w/ negative MRI. She also reports back pain and is under the care of Dr. Beth Albright as well. Worse with knee extension, going down stairs, ambulating for prolonged periods of time and kneeling. Better with rest. Treatment thus far has included rest, activity modifications, oral medications and self guided physical therapy. Denies prior injections. Here today to discuss diagnosis and treatment options.      PMHx notable for L knee ACL/MCL repair age 11.   Negative for tobacco.   Negative for diabetes.     Pain Score:   1    REVIEW OF SYSTEMS:   Constitution: Negative. Negative for chills, fever and night sweats.    Hematologic/Lymphatic: Negative for bleeding problem. Does not bruise/bleed easily.   Skin: Negative for dry skin, itching and rash.   Musculoskeletal: Negative for falls. Positive for right and left knee pain and muscle weakness.     All other review of symptoms were reviewed and found to be noncontributory.     PAST MEDICAL HISTORY:   No past medical history on file.    PAST SURGICAL HISTORY:   No past surgical history on file.    FAMILY HISTORY:   No family history on file.    SOCIAL HISTORY:   Social History     Socioeconomic History    Marital status: Single   Tobacco Use    Smoking status: Never    Smokeless tobacco: Never   Substance and Sexual Activity    Alcohol use: Not Currently     MEDICATIONS:     Current Outpatient Medications:     acyclovir 5% (ZOVIRAX) 5 % Crea, Apply topically 5 (five) times daily., Disp: , Rfl:     budesonide (PULMICORT) 0.5 mg/2 mL nebulizer solution, Take 2 mLs (0.5 mg total) by nebulization once daily. Controller, Disp: 120 mL, Rfl: 3     "fluticasone propionate (FLONASE) 50 mcg/actuation nasal spray, 100 mcg by Nasal route., Disp: , Rfl:     ibuprofen (ADVIL,MOTRIN) 800 MG tablet, Take 800 mg by mouth 3 (three) times daily., Disp: , Rfl:     olopatadine (PATANOL) 0.1 % ophthalmic solution, , Disp: , Rfl:     valACYclovir (VALTREX) 1000 MG tablet, Take 4,000 mg by mouth once., Disp: , Rfl:     azelastine (ASTELIN) 137 mcg (0.1 %) nasal spray, 2 sprays (274 mcg total) by Nasal route 2 (two) times daily., Disp: 30 mL, Rfl: 3    celecoxib (CELEBREX) 200 MG capsule, Take 1 capsule (200 mg total) by mouth 2 (two) times daily., Disp: 30 capsule, Rfl: 2    ALLERGIES:   Review of patient's allergies indicates:   Allergen Reactions    House dust mite Anxiety, Itching, Swelling and Tinitus      PHYSICAL EXAMINATION:  /66 (BP Location: Right arm, Patient Position: Sitting, BP Method: Medium (Automatic))   Pulse (!) 54   Ht 5' 1" (1.549 m)   Wt 66.1 kg (145 lb 11.6 oz)   BMI 27.53 kg/m²   General: Well-developed well-nourished 52 y.o. femalein no acute distress   Cardiovascular: Regular rhythm by palpation of distal pulse, normal color and temperature, no concerning varicosities on symptomatic side   Lungs: No labored breathing or wheezing appreciated   Neuro: Alert and oriented ×3   Psychiatric: well oriented to person, place and time, demonstrates normal mood and affect   Skin: No rashes, lesions or ulcers, normal temperature, turgor, and texture on involved extremity    Ortho/SPM Exam  Examination of bilateral knees demonstrates intact extensor mechanism. Prior scope portals in left knee. No effusion. Central patellar tracking. No patellar apprehension. Normal patellar mobility. 5 degrees of physiologic hyperextension and full flexion symmetrically. No pain with forced flexion or extension. Prominent tenderness along the medial joint line. Negative Eulalio's. Negative Lachman. Stable to varus/valgus stress testing at 0 and 30 deg. Negative posterior " drawer. Ligamentously stable. Positive bridge test for week hip abductors bilaterally.  Mildly tight hamstrings.  Weak core.    IMAGING:  X-rays including standing, weight bearing AP and flexion bilateral knees, BILATERAL knee lateral and sunrise views ordered and images reviewed by me show:    No significant DJD.  No acute findings.  Bilateral lateral patellar tilt.    ASSESSMENT:      ICD-10-CM ICD-9-CM   1. Patellofemoral pain syndrome of both knees  M22.2X1 719.46    M22.2X2      Patellar maltracking bilaterally    PLAN:     -Findings and treatment options were discussed with the patient.  Diagnosis of patellofemoral pain syndrome with some degree of chronic patellar maltracking laterally.  Suspect underlying chondromalacia.  -Celebrex   -PT at [facility] - focus on hip and core strengthening, hamstring stretching  -RTC prn  -All questions answered      Procedures

## 2023-07-31 NOTE — TELEPHONE ENCOUNTER
Called patient to notify that her PCP can put order in for her MRI patient stated she has already completed it

## 2023-08-02 ENCOUNTER — PATIENT MESSAGE (OUTPATIENT)
Dept: SPORTS MEDICINE | Facility: CLINIC | Age: 53
End: 2023-08-02
Payer: OTHER GOVERNMENT

## 2023-08-03 ENCOUNTER — TELEPHONE (OUTPATIENT)
Dept: OTOLARYNGOLOGY | Facility: CLINIC | Age: 53
End: 2023-08-03
Payer: OTHER GOVERNMENT

## 2023-08-03 ENCOUNTER — HOSPITAL ENCOUNTER (OUTPATIENT)
Dept: RADIOLOGY | Facility: OTHER | Age: 53
Discharge: HOME | End: 2023-08-03
Attending: ORTHOPAEDIC SURGERY
Payer: OTHER GOVERNMENT

## 2023-08-03 DIAGNOSIS — M25.552 PAIN OF LEFT HIP: ICD-10-CM

## 2023-08-03 PROCEDURE — 73721 MRI HIP WITHOUT CONTRAST LEFT: ICD-10-PCS | Mod: 26,LT,, | Performed by: RADIOLOGY

## 2023-08-03 PROCEDURE — 73721 MRI JNT OF LWR EXTRE W/O DYE: CPT | Mod: TC,LT

## 2023-08-03 PROCEDURE — 73721 MRI JNT OF LWR EXTRE W/O DYE: CPT | Mod: 26,LT,, | Performed by: RADIOLOGY

## 2023-08-04 ENCOUNTER — TELEPHONE (OUTPATIENT)
Dept: PODIATRY | Facility: CLINIC | Age: 53
End: 2023-08-04
Payer: OTHER GOVERNMENT

## 2023-08-04 ENCOUNTER — PATIENT MESSAGE (OUTPATIENT)
Dept: PODIATRY | Facility: CLINIC | Age: 53
End: 2023-08-04
Payer: OTHER GOVERNMENT

## 2023-08-04 NOTE — TELEPHONE ENCOUNTER
----- Message from Key Cespedes sent at 8/4/2023  9:58 AM CDT -----  Regarding: Orders  Contact: [phone]  Pt calling to get order for EMG faxed to pts pcp Carlos Bowman (phone: [phone]) so it can be approved through insurance. Please call pt to confirm

## 2023-08-07 RX ORDER — CELECOXIB 200 MG/1
200 CAPSULE ORAL 2 TIMES DAILY
Qty: 30 CAPSULE | Refills: 2 | Status: SHIPPED | OUTPATIENT
Start: 2023-08-07

## 2023-08-07 NOTE — TELEPHONE ENCOUNTER
Faxed referral to Dr. Carlos Bowman at [phone] at the [facility] per pt request.

## 2023-08-07 NOTE — PROGRESS NOTES
Patient ID:   oJhana Lennon is a 52 y.o. female.    Chief Complaint:   Follow-up evaluation for left hip pain    HPI:   The patient is returning today after undergoing MRI scan of her left hip.  She continues to report pain in her left groin region.  She also recently had an evaluation of both of her knees.  She is scheduled to see pain management for her back.  She is also scheduled to undergo physical therapy.    Medications:    Current Outpatient Medications:     acyclovir 5% (ZOVIRAX) 5 % Crea, Apply topically 5 (five) times daily., Disp: , Rfl:     azelastine (ASTELIN) 137 mcg (0.1 %) nasal spray, 2 sprays (274 mcg total) by Nasal route 2 (two) times daily., Disp: 30 mL, Rfl: 3    budesonide (PULMICORT) 0.5 mg/2 mL nebulizer solution, Take 2 mLs (0.5 mg total) by nebulization once daily. Controller, Disp: 120 mL, Rfl: 3    celecoxib (CELEBREX) 200 MG capsule, Take 1 capsule (200 mg total) by mouth 2 (two) times daily., Disp: 30 capsule, Rfl: 2    fluticasone propionate (FLONASE) 50 mcg/actuation nasal spray, 100 mcg by Nasal route., Disp: , Rfl:     ibuprofen (ADVIL,MOTRIN) 800 MG tablet, Take 800 mg by mouth 3 (three) times daily., Disp: , Rfl:     olopatadine (PATANOL) 0.1 % ophthalmic solution, , Disp: , Rfl:     valACYclovir (VALTREX) 1000 MG tablet, Take 4,000 mg by mouth once., Disp: , Rfl:     Allergies:  Review of patient's allergies indicates:   Allergen Reactions    House dust mite Anxiety, Itching, Swelling and Tinitus       Past Medical History:  No past medical history on file.     Past Surgical History:  No past surgical history on file.    Social History:  Social History     Occupational History    Not on file   Tobacco Use    Smoking status: Never    Smokeless tobacco: Never   Substance and Sexual Activity    Alcohol use: Not Currently    Drug use: Not on file    Sexual activity: Not on file       Family History:  No family history on file.     ROS:  ROS    Vitals:  There were no vitals taken  "for this visit.    Physical Examination:  Comprehensive Orthopaedic Musculoskeletal Exam   Ortho Exam   Left hip exam:  Positive Stinchfield.  No leg-length discrepancy.  Negative log roll.  Some pain when I passively abduct her hip.  Pain with resisted hip adduction.    Imaging:  I have independently reviewed the following imaging studies performed at [facility]:    MRI Hip Without Contrast Left  Narrative: EXAMINATION:  MRI HIP WITHOUT CONTRAST LEFT    CLINICAL HISTORY:  Hip pain, chronic, labral tear suspected, xray done;Hx of prior groin strain 20 years ago; diagnosed with a "mass" in the groin area years ago;  Pain in left hip    FINDINGS:  There are incidental sacral Tarlov cysts.  No fracture, dislocation, or bone destruction seen.   No marrow replacement, marrow edema, infection, or neoplasm seen.  There is no evidence of AVN.  Intrapelvic contents are unremarkable.  There is no significant joint fluid.  The labrum is intact.  No impingement change seen.  Impression: No significant abnormality seen.  If there is suspicion of a labral tear MR arthrogram may be helpful.    Electronically signed by: Edilberto Yoo MD  Date:    08/04/2023  Time:    08:19    Assessment:  1. Pain of left hip      Plan:  I have explained to the patient that I do not see an exact etiology of her left groin pain.  She is scheduled to undergo physical therapy which I have advised.  Her therapy will include some work on her hips.  In addition, she is scheduled to see pain management for her lumbar spine.  At this time, I do not recommend any surgical intervention for her left hip     No follow-ups on file.         "

## 2023-08-08 ENCOUNTER — OFFICE VISIT (OUTPATIENT)
Dept: PAIN MEDICINE | Facility: CLINIC | Age: 53
End: 2023-08-08
Attending: ANESTHESIOLOGY
Payer: OTHER GOVERNMENT

## 2023-08-08 ENCOUNTER — OFFICE VISIT (OUTPATIENT)
Dept: ORTHOPEDICS | Facility: CLINIC | Age: 53
End: 2023-08-08
Payer: OTHER GOVERNMENT

## 2023-08-08 VITALS
OXYGEN SATURATION: 100 % | WEIGHT: 143.75 LBS | HEART RATE: 58 BPM | BODY MASS INDEX: 27.16 KG/M2 | TEMPERATURE: 98 F | RESPIRATION RATE: 18 BRPM | DIASTOLIC BLOOD PRESSURE: 71 MMHG | SYSTOLIC BLOOD PRESSURE: 114 MMHG

## 2023-08-08 VITALS
HEIGHT: 61 IN | DIASTOLIC BLOOD PRESSURE: 66 MMHG | BODY MASS INDEX: 27.52 KG/M2 | WEIGHT: 145.75 LBS | SYSTOLIC BLOOD PRESSURE: 106 MMHG | HEART RATE: 54 BPM

## 2023-08-08 DIAGNOSIS — M54.50 CHRONIC LEFT-SIDED LOW BACK PAIN, UNSPECIFIED WHETHER SCIATICA PRESENT: ICD-10-CM

## 2023-08-08 DIAGNOSIS — M54.16 LUMBAR RADICULOPATHY: ICD-10-CM

## 2023-08-08 DIAGNOSIS — M25.552 PAIN OF LEFT HIP: Primary | ICD-10-CM

## 2023-08-08 DIAGNOSIS — G89.29 CHRONIC LEFT-SIDED LOW BACK PAIN, UNSPECIFIED WHETHER SCIATICA PRESENT: ICD-10-CM

## 2023-08-08 PROCEDURE — 99999 PR PBB SHADOW E&M-EST. PATIENT-LVL III: ICD-10-PCS | Mod: PBBFAC,,, | Performed by: ORTHOPAEDIC SURGERY

## 2023-08-08 PROCEDURE — 99999 PR PBB SHADOW E&M-EST. PATIENT-LVL IV: ICD-10-PCS | Mod: PBBFAC,,, | Performed by: ANESTHESIOLOGY

## 2023-08-08 PROCEDURE — 99203 PR OFFICE/OUTPT VISIT, NEW, LEVL III, 30-44 MIN: ICD-10-PCS | Mod: S$PBB,,, | Performed by: ANESTHESIOLOGY

## 2023-08-08 PROCEDURE — 99214 PR OFFICE/OUTPT VISIT, EST, LEVL IV, 30-39 MIN: ICD-10-PCS | Mod: S$PBB,,, | Performed by: ORTHOPAEDIC SURGERY

## 2023-08-08 PROCEDURE — 99213 OFFICE O/P EST LOW 20 MIN: CPT | Mod: PBBFAC,PN | Performed by: ORTHOPAEDIC SURGERY

## 2023-08-08 PROCEDURE — 99214 OFFICE O/P EST MOD 30 MIN: CPT | Mod: PBBFAC,27 | Performed by: ANESTHESIOLOGY

## 2023-08-08 PROCEDURE — 99999 PR PBB SHADOW E&M-EST. PATIENT-LVL IV: CPT | Mod: PBBFAC,,, | Performed by: ANESTHESIOLOGY

## 2023-08-08 PROCEDURE — 99214 OFFICE O/P EST MOD 30 MIN: CPT | Mod: S$PBB,,, | Performed by: ORTHOPAEDIC SURGERY

## 2023-08-08 PROCEDURE — 99203 OFFICE O/P NEW LOW 30 MIN: CPT | Mod: S$PBB,,, | Performed by: ANESTHESIOLOGY

## 2023-08-08 PROCEDURE — 99999 PR PBB SHADOW E&M-EST. PATIENT-LVL III: CPT | Mod: PBBFAC,,, | Performed by: ORTHOPAEDIC SURGERY

## 2023-08-08 NOTE — PROGRESS NOTES
Subjective:      Patient ID: Johana Lennon is a 52 y.o. female.    Chief Complaint: Low-back Pain, Neck Pain, Groin Pain, and Finger Pain    Referred by: Self, Aaareferral     Johana Lennon presents to the clinic for the evaluation of left lower back/hip/groin pain. The pain started years ago without particular inciting event. She does, however, have a history of a groin strain many years ago. Symptoms have been slowly worsening over time. The pain is located in the anterior groin, lateral hip, and posterior buttock area on the left. It does radiate down the left lateral thigh and from the groin medially to the knee. The pain is described as aching and tight, particularly in the groin. Symptoms interfere with daily activity. The pain is exacerbated by Walking and Getting out of bed/chair.  The pain is mitigated by rest.  She also endorses chronic weakness in the LLE for which an EMG is pending (ordered by her Podiatrist). She also have right sided neck pain which she believes in myofascial. Her back/hip pain is worse so we will focus on that issue today.     Interventional Pain History  None      History reviewed. No pertinent past medical history.    History reviewed. No pertinent surgical history.    Review of patient's allergies indicates:   Allergen Reactions    House dust mite Anxiety, Itching, Swelling and Tinitus       Current Outpatient Medications   Medication Sig Dispense Refill    acyclovir 5% (ZOVIRAX) 5 % Crea Apply topically 5 (five) times daily.      budesonide (PULMICORT) 0.5 mg/2 mL nebulizer solution Take 2 mLs (0.5 mg total) by nebulization once daily. Controller 120 mL 3    celecoxib (CELEBREX) 200 MG capsule Take 1 capsule (200 mg total) by mouth 2 (two) times daily. 30 capsule 2    fluticasone propionate (FLONASE) 50 mcg/actuation nasal spray 100 mcg by Nasal route.      ibuprofen (ADVIL,MOTRIN) 800 MG tablet Take 800 mg by mouth 3 (three) times daily.      olopatadine (PATANOL) 0.1  % ophthalmic solution       valACYclovir (VALTREX) 1000 MG tablet Take 4,000 mg by mouth once.      azelastine (ASTELIN) 137 mcg (0.1 %) nasal spray 2 sprays (274 mcg total) by Nasal route 2 (two) times daily. 30 mL 3     No current facility-administered medications for this visit.       History reviewed. No pertinent family history.    Social History     Socioeconomic History    Marital status: Single   Tobacco Use    Smoking status: Never    Smokeless tobacco: Never   Substance and Sexual Activity    Alcohol use: Not Currently           Review of Systems   Constitutional: Negative.   HENT: Negative.     Eyes: Negative.    Cardiovascular: Negative.    Respiratory: Negative.     Endocrine: Negative.    Hematologic/Lymphatic: Negative.    Skin: Negative.    Musculoskeletal: Negative.    Gastrointestinal: Negative.    Genitourinary: Negative.    Neurological: Negative.    Psychiatric/Behavioral: Negative.             Objective:   /71   Pulse (!) 58   Temp 97.9 °F (36.6 °C) (Oral)   Resp 18   Wt 65.2 kg (143 lb 11.8 oz)   SpO2 100%   BMI 27.16 kg/m²   Pain Disability Index Review:  Last 3 PDI Scores 8/8/2023   Pain Disability Index (PDI) 14            General    Constitutional: She is oriented to person, place, and time. She appears well-developed and well-nourished.   Pulmonary/Chest: Effort normal.   Neurological: She is alert and oriented to person, place, and time.   Psychiatric: She has a normal mood and affect. Her behavior is normal.     General Musculoskeletal Exam   Gait: antalgic     Right Ankle/Foot Exam     Tests   Heel Walk: able to perform  Tiptoe Walk: able to perform    Left Ankle/Foot Exam     Tests   Heel Walk: unable to perform  Tiptoe Walk: unable to perform      Right Hip Exam     Other   Sensation: normal  Left Hip Exam     Inspection   No deformity of hip.  Quadriceps Atrophy:  negative  Bruising: absent    Tenderness   The patient tender to palpation of the trochanteric bursa,  SI joint and adductor instertion.    Range of Motion   The patient has normal left hip ROM.    Tests   Pain w/ forced internal rotation (ENRIKE): present  Pain w/ forced external rotation (FADIR): present  Jelani: positive  Log Roll: negative    Other   Sensation: normal      Back (L-Spine & T-Spine) / Neck (C-Spine) Exam     Tenderness Right paramedian tenderness of the Lower L-Spine and Sacrum.     Back (L-Spine & T-Spine) Range of Motion   Lateral bend right:  normal   Lateral bend left:  normal   Rotation right:  normal   Rotation left:  normal     Spinal Sensation   Right Side Sensation  L-Spine Level: normal  S-Spine Level: normal  Left Side Sensation  L-Spine Level: normal  S-Spine Level: normal    Back (L-Spine & T-Spine) Tests   Right Side Tests  Femoral Stretch: negative  Left Side Tests  Femoral Stretch: negative      Muscle Strength   Left Lower Extremity   Hip Abduction: 4/5   Hip Adduction: 4/5   Hip Flexion: 4/5 (painful)   Quadriceps:  5/5   Hamstrin/5   Ankle Dorsiflexion:  4/5   Anterior tibial:  4/5   Gastrocsoleus:  4/5   EHL:  3/5    Reflexes     Left Side  Achilles:  0  Babinski Sign:  absent  Ankle Clonus:  absent  Quadriceps:  2+    Right Side   Achilles:  2+  Babinski Sign:  absent  Ankle Clonus:  absent  Quadriceps:  2+    Imaging:  Plain films of the left hip without significant abnormality  MRI of the left hip w/o arthrogram did not show significant abnormality        Assessment:   52F with multifaceted left hip girdle pain with components of trochanteric bursitis, sacroiliitis, and adductor pain complicated by the presence of L5-dominant myotomal weakness.        Encounter Diagnoses   Name Primary?    Lumbar radiculopathy     Chronic left-sided low back pain, unspecified whether sciatica present     Pain of left hip Yes         Plan:   We discussed with the patient the assessment and recommendations. The following is the plan we agreed on:    - Diagnostic/therapeutic hip injection  on the left. The immediate diagnostic value will help clarify her differential diagnosis list  - MRI of the lumbar spine w/o contrast given her L5 myotomal weakness on exam  - Agree with EMG ordered by Podiatry  - RTC 2 weeks after procedure    Paul Lopez MD  [employer] Pain Fellow  35 minutes spent with the patient in direct care today.           Johana was seen today for low-back pain, neck pain, groin pain and finger pain.    Diagnoses and all orders for this visit:    Pain of left hip  -     Procedure Order to Pain Management; Future    Lumbar radiculopathy  -     MRI Lumbar Spine Without Contrast; Future  -     Ambulatory referral/consult to Physical/Occupational Therapy; Future    Chronic left-sided low back pain, unspecified whether sciatica present  -     Ambulatory referral/consult to Physical/Occupational Therapy; Future       I have personally taken the history and examined this patient and agree with the fellow's note as stated above.

## 2023-08-09 ENCOUNTER — PATIENT MESSAGE (OUTPATIENT)
Dept: PAIN MEDICINE | Facility: OTHER | Age: 53
End: 2023-08-09
Payer: OTHER GOVERNMENT

## 2023-08-09 ENCOUNTER — TELEPHONE (OUTPATIENT)
Dept: ADMINISTRATIVE | Facility: OTHER | Age: 53
End: 2023-08-09
Payer: OTHER GOVERNMENT

## 2023-08-09 NOTE — TELEPHONE ENCOUNTER
----- Message from Beth Albright MD sent at 8/9/2023  8:52 AM CDT -----  Ok no sedation no xanax  ----- Message -----  From: Ivonne Vizcarra  Sent: 8/9/2023   8:51 AM CDT  To: Beth Albright MD    Pt. Will like to know can she drive herself home she doesn't have family here, procedure scheduled 9/11/23 Left Hip Injection, please advise.

## 2023-08-12 ENCOUNTER — PATIENT MESSAGE (OUTPATIENT)
Dept: PAIN MEDICINE | Facility: OTHER | Age: 53
End: 2023-08-12
Payer: OTHER GOVERNMENT

## 2023-08-14 ENCOUNTER — PROCEDURE VISIT (OUTPATIENT)
Dept: NEUROLOGY | Facility: CLINIC | Age: 53
End: 2023-08-14
Payer: OTHER GOVERNMENT

## 2023-08-14 DIAGNOSIS — R20.2 NUMBNESS AND TINGLING OF FOOT: ICD-10-CM

## 2023-08-14 DIAGNOSIS — M25.571 CHRONIC ANKLE PAIN, BILATERAL: ICD-10-CM

## 2023-08-14 DIAGNOSIS — M25.572 CHRONIC ANKLE PAIN, BILATERAL: ICD-10-CM

## 2023-08-14 DIAGNOSIS — G89.29 CHRONIC ANKLE PAIN, BILATERAL: ICD-10-CM

## 2023-08-14 DIAGNOSIS — R20.0 NUMBNESS AND TINGLING OF FOOT: ICD-10-CM

## 2023-08-14 DIAGNOSIS — R26.89 IMBALANCE: ICD-10-CM

## 2023-08-14 DIAGNOSIS — R29.898 WEAKNESS OF LEFT FOOT: ICD-10-CM

## 2023-08-14 PROCEDURE — 95910 NRV CNDJ TEST 7-8 STUDIES: CPT | Mod: 26,S$PBB,, | Performed by: PSYCHIATRY & NEUROLOGY

## 2023-08-14 PROCEDURE — 95910 PR NERVE CONDUCTION STUDY; 7-8 STUDIES: ICD-10-PCS | Mod: 26,S$PBB,, | Performed by: PSYCHIATRY & NEUROLOGY

## 2023-08-14 PROCEDURE — 95886 MUSC TEST DONE W/N TEST COMP: CPT | Mod: 26,S$PBB,, | Performed by: PSYCHIATRY & NEUROLOGY

## 2023-08-14 PROCEDURE — 95886 PR EMG COMPLETE, W/ NERVE CONDUCTION STUDIES, 5+ MUSCLES: ICD-10-PCS | Mod: 26,S$PBB,, | Performed by: PSYCHIATRY & NEUROLOGY

## 2023-08-14 PROCEDURE — 95886 MUSC TEST DONE W/N TEST COMP: CPT | Mod: PBBFAC | Performed by: PSYCHIATRY & NEUROLOGY

## 2023-08-14 PROCEDURE — 95910 NRV CNDJ TEST 7-8 STUDIES: CPT | Mod: PBBFAC | Performed by: PSYCHIATRY & NEUROLOGY

## 2023-08-15 ENCOUNTER — TELEPHONE (OUTPATIENT)
Dept: PODIATRY | Facility: CLINIC | Age: 53
End: 2023-08-15
Payer: OTHER GOVERNMENT

## 2023-08-16 ENCOUNTER — HOSPITAL ENCOUNTER (OUTPATIENT)
Dept: RADIOLOGY | Facility: OTHER | Age: 53
Discharge: HOME | End: 2023-08-16
Attending: STUDENT IN AN ORGANIZED HEALTH CARE EDUCATION/TRAINING PROGRAM
Payer: OTHER GOVERNMENT

## 2023-08-16 DIAGNOSIS — M54.16 LUMBAR RADICULOPATHY: ICD-10-CM

## 2023-08-16 PROCEDURE — 72148 MRI LUMBAR SPINE W/O DYE: CPT | Mod: 26,,, | Performed by: RADIOLOGY

## 2023-08-16 PROCEDURE — 72148 MRI LUMBAR SPINE WITHOUT CONTRAST: ICD-10-PCS | Mod: 26,,, | Performed by: RADIOLOGY

## 2023-08-16 PROCEDURE — 72148 MRI LUMBAR SPINE W/O DYE: CPT | Mod: TC

## 2023-08-17 ENCOUNTER — OFFICE VISIT (OUTPATIENT)
Dept: PODIATRY | Facility: CLINIC | Age: 53
End: 2023-08-17
Payer: OTHER GOVERNMENT

## 2023-08-17 VITALS
DIASTOLIC BLOOD PRESSURE: 69 MMHG | SYSTOLIC BLOOD PRESSURE: 102 MMHG | HEART RATE: 60 BPM | BODY MASS INDEX: 26.93 KG/M2 | WEIGHT: 142.63 LBS | HEIGHT: 61 IN

## 2023-08-17 DIAGNOSIS — G89.29 CHRONIC ANKLE PAIN, BILATERAL: ICD-10-CM

## 2023-08-17 DIAGNOSIS — M79.672 FOOT PAIN, BILATERAL: Primary | ICD-10-CM

## 2023-08-17 DIAGNOSIS — M79.671 FOOT PAIN, BILATERAL: Primary | ICD-10-CM

## 2023-08-17 DIAGNOSIS — M25.571 CHRONIC ANKLE PAIN, BILATERAL: ICD-10-CM

## 2023-08-17 DIAGNOSIS — R29.898 WEAKNESS OF LEFT LEG: ICD-10-CM

## 2023-08-17 DIAGNOSIS — M25.572 CHRONIC ANKLE PAIN, BILATERAL: ICD-10-CM

## 2023-08-17 PROCEDURE — 99213 OFFICE O/P EST LOW 20 MIN: CPT | Mod: S$PBB,,, | Performed by: PODIATRIST

## 2023-08-17 PROCEDURE — 99213 OFFICE O/P EST LOW 20 MIN: CPT | Mod: PBBFAC | Performed by: PODIATRIST

## 2023-08-17 PROCEDURE — 99999 PR PBB SHADOW E&M-EST. PATIENT-LVL III: CPT | Mod: PBBFAC,,, | Performed by: PODIATRIST

## 2023-08-17 PROCEDURE — 99213 PR OFFICE/OUTPT VISIT, EST, LEVL III, 20-29 MIN: ICD-10-PCS | Mod: S$PBB,,, | Performed by: PODIATRIST

## 2023-08-17 PROCEDURE — 99999 PR PBB SHADOW E&M-EST. PATIENT-LVL III: ICD-10-PCS | Mod: PBBFAC,,, | Performed by: PODIATRIST

## 2023-08-17 NOTE — PROGRESS NOTES
"Subjective:     Patient ID: Johana Lennon is a 52 y.o. female.    Chief Complaint: Follow-up (F/u x-ray bilateral ankle pain)    Johana is a 52 y.o. female who presents to the podiatry clinic  with complaint of  bilateral ankle pain and left foot weakness. Has 20 year hx of fibular sesamoidectomy and since then she has had ongoing balance problems and now also having numbness/burning in feet L>R and weakness of the left foot to the point that she feels she is dragging the foot and sometimes feels that she may trip on the foot. Onset of the symptoms was several years ago. Precipitating event: none known. Current symptoms include: ability to bear weight, but with some pain and weakness of ankles L>R . Aggravating factors: standing and walking. Symptoms have progressed to a point and plateaued. Patient has had no prior foot problems. Evaluation to date: none. Treatment to date: none. Patients rates pain 0/10 on pain scale.    08/17/2023: follow up for b/l foot and ankle pain and L leg/foot weakness. Had Xrays done which showed mild to moderate wear and tear of midfoot and ankle joint (R>L). Also had EMG done which showed chronic denervation in the left L5 myotome suggestive of radiculopathy at that level L5. She is being treated for this by Pain Management and is going to be getting an injection to help with this. Overall states her feet are feeling fine with little to no pain.     Vitals:    08/17/23 0803   BP: 102/69   Pulse: 60   Weight: 64.7 kg (142 lb 10.2 oz)   Height: 5' 1" (1.549 m)   PainSc:   2   PainLoc: Ankle       Review of Systems   Constitutional: Negative for chills and fever.   Cardiovascular:  Negative for chest pain, claudication and leg swelling.   Respiratory:  Negative for cough and shortness of breath.    Skin:  Negative for dry skin and nail changes.   Musculoskeletal:  Positive for arthritis, back pain, joint pain and muscle weakness (left foot weakness).   Gastrointestinal:  Negative for " nausea and vomiting.   Neurological:  Positive for numbness, paresthesias and sensory change.   Psychiatric/Behavioral:  Negative for altered mental status.         Objective:     Physical Exam  Vitals reviewed.   Constitutional:       Appearance: She is well-developed.   HENT:      Head: Normocephalic.   Cardiovascular:      Pulses:           Dorsalis pedis pulses are 2+ on the right side and 2+ on the left side.        Posterior tibial pulses are 2+ on the right side and 2+ on the left side.      Comments: DP and PT pulses are 2/4 bilaterally.        Pulmonary:      Effort: No respiratory distress.   Musculoskeletal:      Right lower leg: No edema.      Left lower leg: No edema.      Comments: No further diffuse pain with palpation of b/l ankles along lateral gutter. No palpable or visible deformity noted. No crepitus noted to ankle joint ROM.     MMT 5/5 R, 4+/5 L in DF/PF/Inv/Ev resistance with no reproduction of pain in any direction b/l.     Plantarflexed 1st ray b/l, semi reducible with slightly hypermobile 1st ray b/l with 50% collapse of medial longitudinal arch b/l with loading.     Equinus contracture noted to b/l ankles with knee straight and slightly improved with knee bent.      Skin:     General: Skin is warm and dry.      Findings: No erythema.      Comments: No open lesions, lacerations or wounds noted. Nails are normal to R 1-5 and L 1-5. Interdigital spaces clean, dry and intact b/l. No erythema noted to b/l foot. Skin texture normal. Pedal hair normal     Neurological:      Mental Status: She is alert and oriented to person, place, and time.      Sensory: Sensory deficit present.      Comments: Light touch, proprioception, and sharp/dull sensation are all intact bilaterally. Subjective paresthesias with no clearly identifiable source or trigger.    Psychiatric:         Behavior: Behavior normal.         Thought Content: Thought content normal.         Judgment: Judgment normal.            Assessment:      Encounter Diagnoses   Name Primary?    Foot pain, bilateral Yes    Chronic ankle pain, bilateral     Weakness of left leg      Plan:     Johana was seen today for follow-up.    Diagnoses and all orders for this visit:    Foot pain, bilateral    Chronic ankle pain, bilateral    Weakness of left leg      I counseled the patient on her conditions, their implications and medical management.    Xrays reviewed in detail with patient noting wear and tear around midfoot and ankle b/l and small accessory bone vs old fracture of R lateral ankle gutter (stable). No major deformities noted.     EMG/NCS reviewed. Follow up with Pain Management for further treatment.     Consider Neurology referral.     Long discussion with patient regarding appropriate, supportive and comfortable shoes. Recommended supportive style shoe brands with adequate arch supports to alleviate abnormal pressure and improve stability of foot while walking. Avoid flat shoes and barefoot walking as these will exacerbate or worsen symptoms.     Information for [employer] provided with Dr. Barker. She will look into getting a pair.     RTC PRN if any pain symptoms arise.

## 2023-08-23 ENCOUNTER — TELEPHONE (OUTPATIENT)
Dept: PAIN MEDICINE | Facility: CLINIC | Age: 53
End: 2023-08-23
Payer: OTHER GOVERNMENT

## 2023-08-23 NOTE — TELEPHONE ENCOUNTER
----- Message from Mary Bacon sent at 8/22/2023 11:27 AM CDT -----  Contact: Patient  Type:  Patient Call          Who Called: patient         Does the patient know what this is regarding?: Requesting a call back to have her appt rescheduled that was cancelled today ; please advise           Would the patient rather a call back or a response via [facility]? Call           Best Call Back Number: [phone]             Additional Information:

## 2023-08-24 ENCOUNTER — OFFICE VISIT (OUTPATIENT)
Dept: PAIN MEDICINE | Facility: CLINIC | Age: 53
End: 2023-08-24
Attending: ANESTHESIOLOGY
Payer: OTHER GOVERNMENT

## 2023-08-24 VITALS
HEART RATE: 63 BPM | TEMPERATURE: 98 F | BODY MASS INDEX: 26.43 KG/M2 | RESPIRATION RATE: 18 BRPM | WEIGHT: 140 LBS | SYSTOLIC BLOOD PRESSURE: 101 MMHG | HEIGHT: 61 IN | OXYGEN SATURATION: 100 % | DIASTOLIC BLOOD PRESSURE: 68 MMHG

## 2023-08-24 DIAGNOSIS — M54.50 CHRONIC LEFT-SIDED LOW BACK PAIN, UNSPECIFIED WHETHER SCIATICA PRESENT: ICD-10-CM

## 2023-08-24 DIAGNOSIS — G89.29 CHRONIC LEFT-SIDED LOW BACK PAIN, UNSPECIFIED WHETHER SCIATICA PRESENT: ICD-10-CM

## 2023-08-24 DIAGNOSIS — M54.16 LUMBAR RADICULOPATHY: ICD-10-CM

## 2023-08-24 DIAGNOSIS — M25.552 PAIN OF LEFT HIP: Primary | ICD-10-CM

## 2023-08-24 PROCEDURE — 99213 PR OFFICE/OUTPT VISIT, EST, LEVL III, 20-29 MIN: ICD-10-PCS | Mod: S$PBB,,, | Performed by: ANESTHESIOLOGY

## 2023-08-24 PROCEDURE — 99213 OFFICE O/P EST LOW 20 MIN: CPT | Mod: S$PBB,,, | Performed by: ANESTHESIOLOGY

## 2023-08-24 PROCEDURE — 99214 OFFICE O/P EST MOD 30 MIN: CPT | Mod: PBBFAC | Performed by: ANESTHESIOLOGY

## 2023-08-24 PROCEDURE — 99999 PR PBB SHADOW E&M-EST. PATIENT-LVL IV: ICD-10-PCS | Mod: PBBFAC,,, | Performed by: ANESTHESIOLOGY

## 2023-08-24 PROCEDURE — 99999 PR PBB SHADOW E&M-EST. PATIENT-LVL IV: CPT | Mod: PBBFAC,,, | Performed by: ANESTHESIOLOGY

## 2023-08-24 NOTE — PROGRESS NOTES
Subjective:      Patient ID: Johana Lennon is a 52 y.o. female.    Chief Complaint: Hip Pain and Low-back Pain    Referred by: No ref. provider found       Interval HPI 8/24/2023:  Johana Lennon returns for f/u of her left low back/gluteal/groin pain. At her last visit, lumbar MRI and a left hip injection were ordered. She underwent this study and also obtained an EMG in the interim. Her left hip injection has been scheduled for 9/11. She is here today for results review. No change in symptoms.       Original HPI:  Johana Lennon presents to the clinic for the evaluation of left lower back/hip/groin pain. The pain started years ago without particular inciting event. She does, however, have a history of a groin strain many years ago. Symptoms have been slowly worsening over time. The pain is located in the anterior groin, lateral hip, and posterior buttock area on the left. It does radiate down the left lateral thigh and from the groin medially to the knee. The pain is described as aching and tight, particularly in the groin. Symptoms interfere with daily activity. The pain is exacerbated by Walking and Getting out of bed/chair.  The pain is mitigated by rest.  She also endorses chronic weakness in the LLE for which an EMG is pending (ordered by her Podiatrist). She also have right sided neck pain which she believes in myofascial. Her back/hip pain is worse so we will focus on that issue today.       Interventional Pain History  None      History reviewed. No pertinent past medical history.    History reviewed. No pertinent surgical history.    Review of patient's allergies indicates:   Allergen Reactions    House dust mite Anxiety, Itching, Swelling and Tinitus       Current Outpatient Medications   Medication Sig Dispense Refill    acyclovir 5% (ZOVIRAX) 5 % Crea Apply topically 5 (five) times daily.      budesonide (PULMICORT) 0.5 mg/2 mL nebulizer solution Take 2 mLs (0.5 mg total) by nebulization once  "daily. Controller 120 mL 3    celecoxib (CELEBREX) 200 MG capsule Take 1 capsule (200 mg total) by mouth 2 (two) times daily. 30 capsule 2    fluticasone propionate (FLONASE) 50 mcg/actuation nasal spray 100 mcg by Nasal route.      ibuprofen (ADVIL,MOTRIN) 800 MG tablet Take 800 mg by mouth 3 (three) times daily.      olopatadine (PATANOL) 0.1 % ophthalmic solution       valACYclovir (VALTREX) 1000 MG tablet Take 4,000 mg by mouth once.      azelastine (ASTELIN) 137 mcg (0.1 %) nasal spray 2 sprays (274 mcg total) by Nasal route 2 (two) times daily. 30 mL 3     No current facility-administered medications for this visit.       History reviewed. No pertinent family history.    Social History     Socioeconomic History    Marital status: Single   Tobacco Use    Smoking status: Never    Smokeless tobacco: Never   Substance and Sexual Activity    Alcohol use: Not Currently           Review of Systems   Constitutional: Negative.   HENT: Negative.     Eyes: Negative.    Cardiovascular: Negative.    Respiratory: Negative.     Endocrine: Negative.    Hematologic/Lymphatic: Negative.    Skin: Negative.    Musculoskeletal: Negative.    Gastrointestinal: Negative.    Genitourinary: Negative.    Neurological: Negative.    Psychiatric/Behavioral: Negative.             Objective:   /68   Pulse 63   Temp 97.9 °F (36.6 °C) (Oral)   Resp 18   Ht 5' 1" (1.549 m)   Wt 63.5 kg (139 lb 15.9 oz)   SpO2 100%   BMI 26.45 kg/m²   Pain Disability Index Review:      8/24/2023     2:34 PM 8/8/2023     1:12 PM   Last 3 PDI Scores   Pain Disability Index (PDI) 25 14            General    Constitutional: She is oriented to person, place, and time. She appears well-developed and well-nourished.   Pulmonary/Chest: Effort normal.   Neurological: She is alert and oriented to person, place, and time.   Psychiatric: She has a normal mood and affect. Her behavior is normal.     General Musculoskeletal Exam   Gait: antalgic         Right Hip " Exam     Other   Sensation: normal  Left Hip Exam     Inspection   No deformity of hip.  Quadriceps Atrophy:  negative  Bruising: absent    Range of Motion   The patient has normal left hip ROM.    Tests   Pain w/ forced internal rotation (ENRIKE): present  Pain w/ forced external rotation (FADIR): present  Log Roll: negative    Other   Sensation: normal      Back (L-Spine & T-Spine) / Neck (C-Spine) Exam     Spinal Sensation   Right Side Sensation  L-Spine Level: normal  S-Spine Level: normal  Left Side Sensation  L-Spine Level: normal  S-Spine Level: normal    Back (L-Spine & T-Spine) Tests   Right Side Tests  Femoral Stretch: negative  Left Side Tests  Femoral Stretch: negative      Muscle Strength   Left Lower Extremity   Hip Adduction: 4/5   Left hip flexors strength: painful.   Ankle Dorsiflexion:  4/5     Imaging:  EXAMINATION:  MRI LUMBAR SPINE WITHOUT CONTRAST  FINDINGS:  Alignment: Normal.  Vertebrae: Normal marrow signal. No fracture.  Discs: Mild height loss across multiple lumbar levels as below.  Cord: Normal.  Conus terminates at L1.  Degenerative findings:  T12-L1: Sagittal evaluation only unremarkable  L1-L2: Mild facet arthropathy.  No nerve root compression.  L2-L3: Minimal facet arthropathy.  No nerve root compression.  L3-L4: Small circumferential disc bulge with mild facet arthropathy.  Nerve root compression.  L4-L5: Circumferential disc bulge and facet arthropathy.  1 moderate right and mild left neural foraminal narrowing.  Mild canal stenosis.  L5-S1: Intervertebral disc height loss with circumferential disc bulge and facet arthropathy.  The moderate-severe bilateral neural foraminal narrowing.  Moderate canal stenosis.  S1-S2: There is a rudimentary disc space.  No significant nerve root narrowing at this level.  However, there may be some compression of the transiting S1 nerve root due to the L5-S1 process as above.  Paraspinal muscles & soft tissues: Unremarkable.  There are incidental  hepatic cyst.  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  Impression:  Degenerative change of the lumbar spine as above worse at L4-5 and L5-S1.    EMG 2023:          Assessment:   52F with multifaceted left hip girdle pain with components of trochanteric bursitis, sacroiliitis, and adductor pain complicated by the presence of L5-dominant myotomal weakness.        Encounter Diagnoses   Name Primary?    Pain of left hip Yes    Lumbar radiculopathy     Chronic left-sided low back pain, unspecified whether sciatica present            Plan:   We discussed with the patient the assessment and recommendations. The following is the plan we agreed on:    - Lumbar MRI and lower extremity EMG reviewed in detail with the patient today. All questions answered.   - Proceed with left hip injection on 9/11. Discussed the diagnostic value of this injection in the immediate post-procedure window.  - RTC 2 weeks after procedure. Would consider lumbar TFESI vs SIJ if no relief from hip injection.     Paul Lopez MD  [employer] Pain Fellow        Johana was seen today for hip pain and low-back pain.    Diagnoses and all orders for this visit:    Pain of left hip    Lumbar radiculopathy    Chronic left-sided low back pain, unspecified whether sciatica present         I have personally taken the history and examined this patient and agree with the fellow's note as stated above.

## 2023-08-25 ENCOUNTER — TELEPHONE (OUTPATIENT)
Dept: ORTHOPEDICS | Facility: CLINIC | Age: 53
End: 2023-08-25
Payer: OTHER GOVERNMENT

## 2023-08-25 DIAGNOSIS — M50.30 DDD (DEGENERATIVE DISC DISEASE), CERVICAL: Primary | ICD-10-CM

## 2023-08-29 ENCOUNTER — HOSPITAL ENCOUNTER (OUTPATIENT)
Dept: RADIOLOGY | Facility: HOSPITAL | Age: 53
Discharge: HOME | End: 2023-08-29
Attending: ORTHOPAEDIC SURGERY
Payer: OTHER GOVERNMENT

## 2023-08-29 ENCOUNTER — OFFICE VISIT (OUTPATIENT)
Dept: ORTHOPEDICS | Facility: CLINIC | Age: 53
End: 2023-08-29
Payer: OTHER GOVERNMENT

## 2023-08-29 VITALS — HEIGHT: 62 IN | WEIGHT: 140 LBS | BODY MASS INDEX: 25.76 KG/M2

## 2023-08-29 DIAGNOSIS — M47.812 CERVICAL SPONDYLOSIS: Primary | ICD-10-CM

## 2023-08-29 DIAGNOSIS — M50.30 DDD (DEGENERATIVE DISC DISEASE), CERVICAL: ICD-10-CM

## 2023-08-29 PROCEDURE — 99213 OFFICE O/P EST LOW 20 MIN: CPT | Mod: PBBFAC | Performed by: ORTHOPAEDIC SURGERY

## 2023-08-29 PROCEDURE — 72050 X-RAY EXAM NECK SPINE 4/5VWS: CPT | Mod: 26,,, | Performed by: RADIOLOGY

## 2023-08-29 PROCEDURE — 99999 PR PBB SHADOW E&M-EST. PATIENT-LVL III: CPT | Mod: PBBFAC,,, | Performed by: ORTHOPAEDIC SURGERY

## 2023-08-29 PROCEDURE — 99214 OFFICE O/P EST MOD 30 MIN: CPT | Mod: S$PBB,,, | Performed by: ORTHOPAEDIC SURGERY

## 2023-08-29 PROCEDURE — 72050 XR CERVICAL SPINE AP LAT WITH FLEX EXTEN: ICD-10-PCS | Mod: 26,,, | Performed by: RADIOLOGY

## 2023-08-29 PROCEDURE — 99999 PR PBB SHADOW E&M-EST. PATIENT-LVL III: ICD-10-PCS | Mod: PBBFAC,,, | Performed by: ORTHOPAEDIC SURGERY

## 2023-08-29 PROCEDURE — 72050 X-RAY EXAM NECK SPINE 4/5VWS: CPT | Mod: TC

## 2023-08-29 PROCEDURE — 99214 PR OFFICE/OUTPT VISIT, EST, LEVL IV, 30-39 MIN: ICD-10-PCS | Mod: S$PBB,,, | Performed by: ORTHOPAEDIC SURGERY

## 2023-08-29 NOTE — PROGRESS NOTES
DATE: 8/29/2023  PATIENT: Johana Lennon    Attending Physician: Kota Nunez M.D.    CHIEF COMPLAINT: b/l hand numbness    HISTORY:  Johana Lennon is a 52 y.o. female presents for initial evaluation of no neck/arm pain but b/l numbness in pinky fingers. This started in 2017 when she was working on installing back splash in her house. She first noticed numbness in her traps and subsequently in her fingers. There is no subjective weakness. Prior treatments have included ibuprofen and PT, but no SUE or surgery.     The Patient denies myelopathic symptoms such as handwriting changes or difficulty with buttons/coins/keys. Denies perineal paresthesias, bowel/bladder dysfunction.    The patient does not smoke, have DM or endorse IVDU. The patient is not on any blood thinners and does not take chronic narcotics. She works as a [employer].    PAST MEDICAL/SURGICAL HISTORY:  History reviewed. No pertinent past medical history.  History reviewed. No pertinent surgical history.    Current Medications:   Current Outpatient Medications:     acyclovir 5% (ZOVIRAX) 5 % Crea, Apply topically 5 (five) times daily., Disp: , Rfl:     azelastine (ASTELIN) 137 mcg (0.1 %) nasal spray, 2 sprays (274 mcg total) by Nasal route 2 (two) times daily., Disp: 30 mL, Rfl: 3    budesonide (PULMICORT) 0.5 mg/2 mL nebulizer solution, Take 2 mLs (0.5 mg total) by nebulization once daily. Controller, Disp: 120 mL, Rfl: 3    celecoxib (CELEBREX) 200 MG capsule, Take 1 capsule (200 mg total) by mouth 2 (two) times daily., Disp: 30 capsule, Rfl: 2    fluticasone propionate (FLONASE) 50 mcg/actuation nasal spray, 100 mcg by Nasal route., Disp: , Rfl:     ibuprofen (ADVIL,MOTRIN) 800 MG tablet, Take 800 mg by mouth 3 (three) times daily., Disp: , Rfl:     olopatadine (PATANOL) 0.1 % ophthalmic solution, , Disp: , Rfl:     valACYclovir (VALTREX) 1000 MG tablet, Take 4,000 mg by mouth once., Disp: , Rfl:     Social History:   Social History  "    Socioeconomic History    Marital status: Single   Tobacco Use    Smoking status: Never    Smokeless tobacco: Never   Substance and Sexual Activity    Alcohol use: Not Currently       REVIEW OF SYSTEMS:  Constitution: Negative. Negative for chills, fever and night sweats.   Cardiovascular: Negative for chest pain and syncope.   Respiratory: Negative for cough and shortness of breath.   Gastrointestinal: See HPI. Negative for nausea/vomiting. Negative for abdominal pain.  Genitourinary: See HPI. Negative for discoloration or dysuria.  Skin: Negative for dry skin, itching and rash.   Hematologic/Lymphatic: negative for bleeding/clotting disorders.   Musculoskeletal: Negative for falls and muscle weakness.   Neurological: See HPI. no history of seizures. no history of cranial surgery or shunts.  Endocrine: Negative for polydipsia, polyphagia and polyuria.   Allergic/Immunologic: Negative for hives and persistent infections.  Psychiatric/Behavioral: Negative for depression and insomnia.         EXAM:  Ht 5' 1.5" (1.562 m)   Wt 63.5 kg (139 lb 15.9 oz)   BMI 26.02 kg/m²     General: The patient is a 52 y.o. female in no apparent distress, the patient is orientatied to person, place and time.  Psych: Normal mood and affect  HEENT: Vision grossly intact, hearing intact to the spoken word.  Lungs: Respirations unlabored.  Gait: Normal station and gait, no difficulty with toe or heel walk.   Skin: Cervical skin negative for rashes, lesions, hairy patches and surgical scars.  Range of motion: Cervical range of motion is acceptable. There is no tenderness to palpation.  Spinal Balance: Global saggital and coronal spinal balance acceptable, no significant for scoliosis and kyphosis.  Musculoskeletal: No pain with the range of motion of the bilateral shoulders and elbows. Normal bulk and contour of the bilateral hands.  Vascular: Bilateral hands warm and well perfused, Radial pulses 2+ bilaterally.  Neurological: Normal " "strength and tone in all major motor groups in the bilateral upper and lower extremities. Normal sensation to light touch in the C5-T1 and L2-S1 dermatomes bilaterally.  Deep tendon reflexes symmetric 2+ in the bilateral upper and lower extremities.  Negative Inverted Radial Reflex and Vergara's bilaterally. Negative Babinski bilaterally.     IMAGING:   Today I independently reviewed the following images and my interpretations are as follows:    AP, Lat and Flex/Ex  upright C-spine films demonstrate spondylosis and DDD worst at C5-7.     Body mass index is 26.02 kg/m².  No results found for: "HGBA1C"    ASSESSMENT/PLAN:  Cervical spondylosis    DDD (degenerative disc disease), cervical      Follow up if symptoms worsen or fail to improve.    Patient has cervical spondylosis but she has no neck/arm symptoms except for b/l pink numbness. It's more likely to have a distal cause, so I referred her to see Hand. I discussed the natural history of their diagnoses as well as surgical and nonsurgical treatment options. I educated the patient on the importance of core/back strengthening, correct posture, bending/lifting ergonomics, and low-impact aerobic exercises (walking, elliptical, and aquatherapy). Continue medications. Patient will follow up PRN. Next step is a cervical MRI if negative hand workup.    Kota Nunez MD  Orthopaedic Spine Surgeon  Department of Orthopaedic Surgery  [phone]      "

## 2023-08-31 ENCOUNTER — PATIENT MESSAGE (OUTPATIENT)
Dept: ORTHOPEDICS | Facility: CLINIC | Age: 53
End: 2023-08-31
Payer: OTHER GOVERNMENT

## 2023-08-31 DIAGNOSIS — M79.641 BILATERAL HAND PAIN: Primary | ICD-10-CM

## 2023-08-31 DIAGNOSIS — M79.642 BILATERAL HAND PAIN: Primary | ICD-10-CM

## 2023-09-01 ENCOUNTER — TELEPHONE (OUTPATIENT)
Dept: ORTHOPEDICS | Facility: CLINIC | Age: 53
End: 2023-09-01
Payer: OTHER GOVERNMENT

## 2023-09-01 ENCOUNTER — HOSPITAL ENCOUNTER (OUTPATIENT)
Dept: RADIOLOGY | Facility: HOSPITAL | Age: 53
Discharge: HOME | End: 2023-09-01
Attending: PHYSICIAN ASSISTANT
Payer: OTHER GOVERNMENT

## 2023-09-01 ENCOUNTER — OFFICE VISIT (OUTPATIENT)
Dept: ORTHOPEDICS | Facility: CLINIC | Age: 53
End: 2023-09-01
Payer: OTHER GOVERNMENT

## 2023-09-01 DIAGNOSIS — M79.641 BILATERAL HAND PAIN: ICD-10-CM

## 2023-09-01 DIAGNOSIS — G56.20 ULNAR NEUROPATHY, UNSPECIFIED LATERALITY: Primary | ICD-10-CM

## 2023-09-01 DIAGNOSIS — M79.642 BILATERAL HAND PAIN: ICD-10-CM

## 2023-09-01 PROCEDURE — 99214 OFFICE O/P EST MOD 30 MIN: CPT | Mod: S$PBB,,, | Performed by: PHYSICIAN ASSISTANT

## 2023-09-01 PROCEDURE — 73130 XR HAND COMPLETE 3 VIEWS BILATERAL: ICD-10-PCS | Mod: 26,50,, | Performed by: RADIOLOGY

## 2023-09-01 PROCEDURE — 99214 PR OFFICE/OUTPT VISIT, EST, LEVL IV, 30-39 MIN: ICD-10-PCS | Mod: S$PBB,,, | Performed by: PHYSICIAN ASSISTANT

## 2023-09-01 PROCEDURE — 73130 X-RAY EXAM OF HAND: CPT | Mod: 26,50,, | Performed by: RADIOLOGY

## 2023-09-01 PROCEDURE — 99999 PR PBB SHADOW E&M-EST. PATIENT-LVL III: ICD-10-PCS | Mod: PBBFAC,,, | Performed by: PHYSICIAN ASSISTANT

## 2023-09-01 PROCEDURE — 99999 PR PBB SHADOW E&M-EST. PATIENT-LVL III: CPT | Mod: PBBFAC,,, | Performed by: PHYSICIAN ASSISTANT

## 2023-09-01 PROCEDURE — 99213 OFFICE O/P EST LOW 20 MIN: CPT | Mod: PBBFAC | Performed by: PHYSICIAN ASSISTANT

## 2023-09-01 PROCEDURE — 73130 X-RAY EXAM OF HAND: CPT | Mod: TC,50

## 2023-09-01 NOTE — TELEPHONE ENCOUNTER
----- Message from Jamie L. Russo-Digeorge, PA-C sent at 9/1/2023 10:24 AM CDT -----  Can you try and get this patients EMG scheduled, first available. She is willing to go to any of the locations

## 2023-09-01 NOTE — TELEPHONE ENCOUNTER
Called and spoke with the pt and scheduled her EMG at [facility] location as per pt's preference and informed pt. Pt verbalized understanding and was thankful.

## 2023-09-01 NOTE — PROGRESS NOTES
[facility]  History & Physical  Orthopedics    SUBJECTIVE:      Chief Complaint: Bilateral small finger numbness    Referring Provider: Luiza Mccarty Dr. is the supervising physician for this encounter/patient    History of Present Illness:  Patient is a 52 y.o. right hand dominant female who presents today with complaints of bilateral numbness into the small finger and increased sensitivity as the inside of both elbows, right greater than left. This has been present for about 5 years now. Numbness will become worse at night and sometimes wake her. No surgical history on the arms.     The patient is a/an [employer].    Onset of symptoms/DOI was 5+ years.    Symptoms are aggravated by activity, at night, and during the day.    Symptoms are alleviated by rest.    Symptoms consist of pain and numbness/tingling.    The patient rates their pain as a 7/10.    Attempted treatment(s) and/or interventions include activity modifications, rest, physical therapy.     The patient denies any fevers, chills, N/V, D/C and presents for evaluation.       No past medical history on file.  No past surgical history on file.  Review of patient's allergies indicates:   Allergen Reactions    House dust mite Anxiety, Itching, Swelling and Tinitus     Social History     Social History Narrative    Not on file     No family history on file.      Current Outpatient Medications:     acyclovir 5% (ZOVIRAX) 5 % Crea, Apply topically 5 (five) times daily., Disp: , Rfl:     azelastine (ASTELIN) 137 mcg (0.1 %) nasal spray, 2 sprays (274 mcg total) by Nasal route 2 (two) times daily., Disp: 30 mL, Rfl: 3    budesonide (PULMICORT) 0.5 mg/2 mL nebulizer solution, Take 2 mLs (0.5 mg total) by nebulization once daily. Controller, Disp: 120 mL, Rfl: 3    celecoxib (CELEBREX) 200 MG capsule, Take 1 capsule (200 mg total) by mouth 2 (two) times daily., Disp: 30 capsule, Rfl: 2    fluticasone propionate (FLONASE)  50 mcg/actuation nasal spray, 100 mcg by Nasal route., Disp: , Rfl:     ibuprofen (ADVIL,MOTRIN) 800 MG tablet, Take 800 mg by mouth 3 (three) times daily., Disp: , Rfl:     olopatadine (PATANOL) 0.1 % ophthalmic solution, , Disp: , Rfl:     valACYclovir (VALTREX) 1000 MG tablet, Take 4,000 mg by mouth once., Disp: , Rfl:       Review of Systems:  Constitutional: no fever or chills  Eyes: no visual changes  ENT: no nasal congestion or sore throat  Respiratory: no cough or shortness of breath  Cardiovascular: no chest pain  Gastrointestinal: no nausea or vomiting, tolerating diet  Musculoskeletal: pain, soreness, and numbness/tingling    OBJECTIVE:      Vital Signs (Most Recent):  There were no vitals filed for this visit.  There is no height or weight on file to calculate BMI.      Physical Exam:  Constitutional: The patient appears well-developed and well-nourished. No distress.   Skin: No lesions appreciated  Head: Normocephalic and atraumatic.   Nose: Nose normal.   Ears: No deformities seen  Eyes: Conjunctivae and EOM are normal.   Neck: No tracheal deviation present.   Cardiovascular: Normal rate and intact distal pulses.    Pulmonary/Chest: Effort normal. No respiratory distress.   Abdominal: There is no guarding.   Neurological: The patient is alert.   Psychiatric: The patient has a normal mood and affect.     Bilateral Hand/Wrist Examination:    Observation/Inspection:  Swelling  none    Deformity  none  Discoloration  none     Scars   none    Atrophy  none    HAND/WRIST EXAMINATION:  Finkelstein's Test   Neg  WHAT Test    Neg  Snuff box tenderness   Neg  Gil's Test    Neg  Hook of Hamate Tenderness  Neg  CMC grind    Neg  Circumduction test   Neg    Neurovascular Exam:  Digits WWP, brisk CR < 3s throughout  NVI motor/LTS to M/R/U nerves, radial pulse 2+  Tinel's Test - Carpal Tunnel  Pos on the right  Tinel's Test - Cubital Tunnel  Pos bilateral, right greater than left  Phalen's Test    Pos small  finger  Median Nerve Compression Test Pos small finger    ROM hand full, painless    ROM wrist full, painless    ROM elbow full, painless    Abdomen not guarded  Respirations nonlabored  Perfusion intact    Diagnostic Results:     Imaging - I independently viewed the patient's imaging as well as the radiology report.  Xrays of the patient's bilateral  demonstrate no evidence of any acute fractures or dislocations or significant degenerative changes. Noted however is what appears to be a small bone cyst/island in the 4th middle phalanx on the left hand.    EMG - none    ASSESSMENT/PLAN:      52 y.o. yo female with Bilateral cubital tunnel syndrome    Plan: The patient and I had a thorough discussion today.  We discussed the working diagnosis as well as several other potential alternative diagnoses.  Treatment options were discussed, both conservative and surgical.  Conservative treatment options would include things such as activity modifications, workplace modifications, a period of rest, oral vs topical OTC and prescription anti-inflammatory medications, occupational therapy, splinting/bracing, immobilization, corticosteroid injections, and others.  Surgical options were discussed as well.     At this time, the patient would like to proceed with a trial of EMG for further evaluation. She will RTC 1 week after EMG for results and treatment options.    Should the patient's symptoms worsen, persist, or fail to improve they should return for reevaluation and I would be happy to see them back anytime.           Please do not hesitate to reach out to us via email, phone, or MyChart with any questions, concerns, or feedback.

## 2023-09-04 ENCOUNTER — PATIENT MESSAGE (OUTPATIENT)
Dept: ADMINISTRATIVE | Facility: OTHER | Age: 53
End: 2023-09-04
Payer: OTHER GOVERNMENT

## 2023-09-05 ENCOUNTER — PATIENT MESSAGE (OUTPATIENT)
Dept: ADMINISTRATIVE | Facility: OTHER | Age: 53
End: 2023-09-05
Payer: OTHER GOVERNMENT

## 2023-09-06 ENCOUNTER — PATIENT MESSAGE (OUTPATIENT)
Dept: ADMINISTRATIVE | Facility: OTHER | Age: 53
End: 2023-09-06
Payer: OTHER GOVERNMENT

## 2023-09-07 ENCOUNTER — TELEPHONE (OUTPATIENT)
Dept: PAIN MEDICINE | Facility: CLINIC | Age: 53
End: 2023-09-07
Payer: OTHER GOVERNMENT

## 2023-09-07 NOTE — TELEPHONE ENCOUNTER
----- Message from Cecelia Power sent at 9/7/2023 12:26 PM CDT -----  Regarding: pt call back/PROCEDURE  Name of Who is Calling: SCOTT FRANKS [[id number]]        What is the request in detail: pt needs a call back from luis enrique in regards's to procedure on Monday, please advise.        Can the clinic reply by [facility]: no           What Number [facility] Back [facility]: [phone]

## 2023-09-08 ENCOUNTER — PATIENT MESSAGE (OUTPATIENT)
Dept: PAIN MEDICINE | Facility: OTHER | Age: 53
End: 2023-09-08
Payer: OTHER GOVERNMENT

## 2023-09-08 ENCOUNTER — TELEPHONE (OUTPATIENT)
Dept: PAIN MEDICINE | Facility: CLINIC | Age: 53
End: 2023-09-08
Payer: OTHER GOVERNMENT

## 2023-09-08 ENCOUNTER — TELEPHONE (OUTPATIENT)
Dept: ADMINISTRATIVE | Facility: OTHER | Age: 53
End: 2023-09-08
Payer: OTHER GOVERNMENT

## 2023-09-08 NOTE — TELEPHONE ENCOUNTER
Staff spoke with patient in regards to canceling upcoming procedure due to a positive Covid test result. Staff directed patient to call the procedure scheduling are to cancel appointment at [phone]

## 2023-09-08 NOTE — TELEPHONE ENCOUNTER
----- Message from Cecelia Power sent at 9/8/2023  2:10 PM CDT -----  Regarding: pt call back/procedure  Name of Who is Calling: SCOTT FRANKS [[id number]]        What is the request in detail: pt needs a call back to cancel and reschedule her procedure for 9/11 because she hadn't received a call concerning it and she also has covid right now, please advise.        Can the clinic reply by [facility]: no           What Number to Call Back [facility]: [phone]

## 2023-10-05 ENCOUNTER — PATIENT MESSAGE (OUTPATIENT)
Dept: ADMINISTRATIVE | Facility: OTHER | Age: 53
End: 2023-10-05
Payer: OTHER GOVERNMENT

## 2023-10-06 ENCOUNTER — PATIENT MESSAGE (OUTPATIENT)
Dept: ADMINISTRATIVE | Facility: OTHER | Age: 53
End: 2023-10-06
Payer: OTHER GOVERNMENT

## 2023-10-08 ENCOUNTER — PATIENT MESSAGE (OUTPATIENT)
Dept: ADMINISTRATIVE | Facility: OTHER | Age: 53
End: 2023-10-08
Payer: OTHER GOVERNMENT

## 2023-10-09 ENCOUNTER — PROCEDURE VISIT (OUTPATIENT)
Dept: NEUROLOGY | Facility: CLINIC | Age: 53
End: 2023-10-09
Payer: OTHER GOVERNMENT

## 2023-10-09 DIAGNOSIS — G56.20 ULNAR NEUROPATHY, UNSPECIFIED LATERALITY: ICD-10-CM

## 2023-10-09 PROCEDURE — 95886 MUSC TEST DONE W/N TEST COMP: CPT | Mod: PBBFAC | Performed by: PHYSICAL MEDICINE & REHABILITATION

## 2023-10-09 PROCEDURE — 95912 NRV CNDJ TEST 11-12 STUDIES: CPT | Mod: PBBFAC | Performed by: PHYSICAL MEDICINE & REHABILITATION

## 2023-10-09 PROCEDURE — 95912 PR NERVE CONDUCTION STUDY; 11 -12 STUDIES: ICD-10-PCS | Mod: 26,S$PBB,, | Performed by: PHYSICAL MEDICINE & REHABILITATION

## 2023-10-09 PROCEDURE — 95885 MUSC TST DONE W/NERV TST LIM: CPT | Mod: 26,59,S$PBB, | Performed by: PHYSICAL MEDICINE & REHABILITATION

## 2023-10-09 PROCEDURE — 95886 MUSC TEST DONE W/N TEST COMP: CPT | Mod: 26,S$PBB,, | Performed by: PHYSICAL MEDICINE & REHABILITATION

## 2023-10-09 PROCEDURE — 95885 MUSC TST DONE W/NERV TST LIM: CPT | Mod: 59,PBBFAC | Performed by: PHYSICAL MEDICINE & REHABILITATION

## 2023-10-09 PROCEDURE — 95886 PR EMG COMPLETE, W/ NERVE CONDUCTION STUDIES, 5+ MUSCLES: ICD-10-PCS | Mod: 26,S$PBB,, | Performed by: PHYSICAL MEDICINE & REHABILITATION

## 2023-10-09 PROCEDURE — 95912 NRV CNDJ TEST 11-12 STUDIES: CPT | Mod: 26,S$PBB,, | Performed by: PHYSICAL MEDICINE & REHABILITATION

## 2023-10-09 PROCEDURE — 95885 PR MUSC TST DONE W/NERV TST LIM: ICD-10-PCS | Mod: 26,59,S$PBB, | Performed by: PHYSICAL MEDICINE & REHABILITATION

## 2023-10-09 NOTE — PROCEDURES
Test Date:  10/9/2023    Patient: johana valdes : 1970 Physician: Rubin Canas D.O.   ID#: [id number] SEX: Female Ref. Phys: Russo-Digeorge, Jamie L*     HPI: Johana Valdes is a 52 y.o.female who presents for NCS/EMG to evaluate ulnar neuropathy bilaterally.     NCV & EMG Findings:  All nerve conduction studies (as indicated in the following tables) were within normal limits.  All examined muscles (as indicated in the following table) showed no evidence of electrical instability.    Impression:  This is a normal electrophysiologic study of the bilateral upper extremities.  No electrophysiologic evidence of ulnar neuropathy on either side.  She does have a few trigger points in the left rhomboid, left levator scapulae, and possibly right upper trapezius that do reproduce symptoms into the hands when palpated.  I suspect this to be the cause of her hand symptoms.  If so, physical therapy to work on scapular strengthening should be helpful.        ___________________________  Rubin Canas D.O.        NCS+  Motor Nerve Results      Latency Amplitude F-Lat Segment Distance CV Comment   Site (ms) Norm (mV) Norm (ms)  (cm) (m/s) Norm    Left Median (APB)   Wrist 2.8  < 4.4 6.6  > 4.2  Wrist-Palm - - -    Elbow 6.4 - 6.2 -  Elbow-Wrist 23.5 65  > 51    Right Median (APB)   Wrist 3.6  < 4.4 7.1  > 4.2  Wrist-Palm - - -    Elbow 7.0 - 6.7 -  Elbow-Wrist 22 65  > 51    Left Ulnar (ADM)   Wrist 2.6  < 3.7 9.0  > 3.0         Bel Elbow 6.0 - 8.5 -  Bel Elbow-Wrist 25 74  > 52    Abv Elbow 7.3 - 8.2 -  Abv Elbow-Bel Elbow 9 69  > 43    Left Ulnar (FDI)   Wrist 3.5 - 8.0 -         Bel Elbow 6.9 - 7.2 -  Bel Elbow-Wrist 25 74  > 52    Abv Elbow 8.2 - 7.1 -  Abv Elbow-Bel Elbow 9 69  > 43    Right Ulnar (ADM)   Wrist 2.7  < 3.7 10.6  > 3.0         Bel Elbow 5.8 - 11.3 -  Bel Elbow-Wrist 21 68  > 52    Abv Elbow 7.1 - 11.3 -  Abv Elbow-Bel Elbow 11 85  > 43    Right Ulnar (FDI)   Wrist 3.8 - 11.4 -          Bel Elbow 6.5 - 10.7 -  Bel Elbow-Wrist 21 78  > 52    Abv Elbow 8.3 - 9.8 -  Abv Elbow-Bel Elbow 11 61  > 43      Sensory Nerve Results      Latency (Peak) Amplitude (P-P) Segment Distance CV Comment   Site (ms) Norm (µV) Norm  (cm) (m/s) Norm    Left Median   Wrist-Dig II 2.9  < 4.0 63  > 8 Wrist-Dig II 13 45  > 39    Right Median   Wrist-Dig II 3.3  < 4.0 39  > 8 Wrist-Dig II 14 42  > 39    Left Ulnar   Wrist-Dig V 2.8  < 4.0 56  > 4 Wrist-Dig V 14 50  > 38    Right Ulnar   Wrist-Dig V 2.7  < 4.0 56  > 4 Wrist-Dig V 13.5 50  > 38    Left Dorsal Ulnar Cutaneous   Wrist-Dorsum 5th MC 2.2 - 17 - Wrist-Dorsum 5th MC - - -    Right Dorsal Ulnar Cutaneous   Wrist-Dorsum 5th MC 1.95 - 17 - Wrist-Dorsum 5th MC - - -    Right Radial   Forearm-Wrist 1.78  < 2.8 35  > 11 Forearm-Wrist 10 56 -      EMG+     Side Muscle Nerve Root Ins Act Fibs Psw Amp Dur Poly Recrt Int Pat Comment   Left Deltoid Axillary C5-C6 Nml Nml Nml Nml Nml 0 Nml Nml    Left Biceps Musculocut C5-C6 Nml Nml Nml Nml Nml 0 Nml Nml    Left Triceps Radial C6-C8 Nml Nml Nml Nml Nml 0 Nml Nml    Left FDI Ulnar C8-T1 Nml Nml Nml Nml Nml 0 Nml Nml    Left ADM Ulnar C8-T1 Nml Nml Nml Nml Nml 0 Nml Nml    Right ADM Ulnar C8-T1 Nml Nml Nml Nml Nml 0 Nml Nml    Right FDI Ulnar C8-T1 Nml Nml Nml Nml Nml 0 Nml Nml            Waveforms:    Motor              Sensory

## 2023-10-11 ENCOUNTER — PATIENT MESSAGE (OUTPATIENT)
Dept: FAMILY MEDICINE | Facility: CLINIC | Age: 53
End: 2023-10-11

## 2023-10-11 ENCOUNTER — OFFICE VISIT (OUTPATIENT)
Dept: ORTHOPEDICS | Facility: CLINIC | Age: 53
End: 2023-10-11
Payer: OTHER GOVERNMENT

## 2023-10-11 DIAGNOSIS — R20.2 PARESTHESIA OF HAND, BILATERAL: Primary | ICD-10-CM

## 2023-10-11 PROCEDURE — 99213 PR OFFICE/OUTPT VISIT, EST, LEVL III, 20-29 MIN: ICD-10-PCS | Mod: S$PBB,,, | Performed by: PHYSICIAN ASSISTANT

## 2023-10-11 PROCEDURE — 99213 OFFICE O/P EST LOW 20 MIN: CPT | Mod: S$PBB,,, | Performed by: PHYSICIAN ASSISTANT

## 2023-10-11 PROCEDURE — 99999 PR PBB SHADOW E&M-EST. PATIENT-LVL II: CPT | Mod: PBBFAC,,, | Performed by: PHYSICIAN ASSISTANT

## 2023-10-11 PROCEDURE — 99999 PR PBB SHADOW E&M-EST. PATIENT-LVL II: ICD-10-PCS | Mod: PBBFAC,,, | Performed by: PHYSICIAN ASSISTANT

## 2023-10-11 PROCEDURE — 99212 OFFICE O/P EST SF 10 MIN: CPT | Mod: PBBFAC | Performed by: PHYSICIAN ASSISTANT

## 2023-10-11 NOTE — PROGRESS NOTES
[facility]  History & Physical  Orthopedics    SUBJECTIVE:      Chief Complaint: Bilateral small finger numbness    Referring Provider: No ref. provider found     Dr. Kebede is the supervising physician for this encounter/patient    History of Present Illness:  Patient is a 52 y.o. right hand dominant female who presents today with complaints of bilateral numbness into the small finger and increased sensitivity as the inside of both elbows, right greater than left. This has been present for about 5 years now. Numbness will become worse at night and sometimes wake her. No surgical history on the arms.     Interval History 10/11/23: the patient is here today for her EMG results. Her symptoms are the same, just some numbness into the small fingers. She is scheduled for left hip injection with pain management tomorrow.    The patient is a/an [employer].    Onset of symptoms/DOI was 5+ years.    Symptoms are aggravated by activity, at night, and during the day.    Symptoms are alleviated by rest.    Symptoms consist of pain and numbness/tingling.    The patient rates their pain as a 7/10.    Attempted treatment(s) and/or interventions include activity modifications, rest, physical therapy.     The patient denies any fevers, chills, N/V, D/C and presents for evaluation.       No past medical history on file.  No past surgical history on file.  Review of patient's allergies indicates:   Allergen Reactions    House dust mite Anxiety, Itching, Swelling and Tinitus     Social History     Social History Narrative    Not on file     No family history on file.      Current Outpatient Medications:     acyclovir 5% (ZOVIRAX) 5 % Crea, Apply topically 5 (five) times daily., Disp: , Rfl:     azelastine (ASTELIN) 137 mcg (0.1 %) nasal spray, 2 sprays (274 mcg total) by Nasal route 2 (two) times daily., Disp: 30 mL, Rfl: 3    budesonide (PULMICORT) 0.5 mg/2 mL nebulizer solution, Take 2 mLs (0.5 mg total)  by nebulization once daily. Controller, Disp: 120 mL, Rfl: 3    celecoxib (CELEBREX) 200 MG capsule, Take 1 capsule (200 mg total) by mouth 2 (two) times daily., Disp: 30 capsule, Rfl: 2    fluticasone propionate (FLONASE) 50 mcg/actuation nasal spray, 100 mcg by Nasal route., Disp: , Rfl:     ibuprofen (ADVIL,MOTRIN) 800 MG tablet, Take 800 mg by mouth 3 (three) times daily., Disp: , Rfl:     olopatadine (PATANOL) 0.1 % ophthalmic solution, , Disp: , Rfl:     valACYclovir (VALTREX) 1000 MG tablet, Take 4,000 mg by mouth once., Disp: , Rfl:       Review of Systems:  Constitutional: no fever or chills  Eyes: no visual changes  ENT: no nasal congestion or sore throat  Respiratory: no cough or shortness of breath  Cardiovascular: no chest pain  Gastrointestinal: no nausea or vomiting, tolerating diet  Musculoskeletal: pain, soreness, and numbness/tingling    OBJECTIVE:      Vital Signs (Most Recent):  There were no vitals filed for this visit.  There is no height or weight on file to calculate BMI.      Physical Exam:  Constitutional: The patient appears well-developed and well-nourished. No distress.   Skin: No lesions appreciated  Head: Normocephalic and atraumatic.   Nose: Nose normal.   Ears: No deformities seen  Eyes: Conjunctivae and EOM are normal.   Neck: No tracheal deviation present.   Cardiovascular: Normal rate and intact distal pulses.    Pulmonary/Chest: Effort normal. No respiratory distress.   Abdominal: There is no guarding.   Neurological: The patient is alert.   Psychiatric: The patient has a normal mood and affect.     Bilateral Hand/Wrist Examination:    Observation/Inspection:  Swelling  none    Deformity  none  Discoloration  none     Scars   none    Atrophy  none    HAND/WRIST EXAMINATION:  Finkelstein's Test   Neg  WHAT Test    Neg  Snuff box tenderness   Neg  Gil's Test    Neg  Hook of Hamate Tenderness  Neg  CMC grind    Neg  Circumduction test   Neg    Neurovascular Exam:  Digits WWP,  brisk CR < 3s throughout  NVI motor/LTS to M/R/U nerves, radial pulse 2+  Tinel's Test - Carpal Tunnel  Pos on the right  Tinel's Test - Cubital Tunnel  Pos bilateral, right greater than left  Phalen's Test    Pos small finger  Median Nerve Compression Test Pos small finger    ROM hand full, painless    ROM wrist full, painless    ROM elbow full, painless    Abdomen not guarded  Respirations nonlabored  Perfusion intact    Diagnostic Results:     Imaging - I independently viewed the patient's imaging as well as the radiology report.  Xrays of the patient's bilateral  demonstrate no evidence of any acute fractures or dislocations or significant degenerative changes. Noted however is what appears to be a small bone cyst/island in the 4th middle phalanx on the left hand.    EMG - 10/9/23  Impression:  This is a normal electrophysiologic study of the bilateral upper extremities.  No electrophysiologic evidence of ulnar neuropathy on either side.  She does have a few trigger points in the left rhomboid, left levator scapulae, and possibly right upper trapezius that do reproduce symptoms into the hands when palpated.  I suspect this to be the cause of her hand symptoms.  If so, physical therapy to work on scapular strengthening should be helpful.      ASSESSMENT/PLAN:      52 y.o. yo female with Normal EMG, however several trigger points noted.    Plan: The patient and I had a thorough discussion today.  We discussed the working diagnosis as well as several other potential alternative diagnoses.  Treatment options were discussed, both conservative and surgical.  Conservative treatment options would include things such as activity modifications, workplace modifications, a period of rest, oral vs topical OTC and prescription anti-inflammatory medications, occupational therapy, splinting/bracing, immobilization, corticosteroid injections, and others.  Surgical options were discussed as well.     At this time, the patient  would like to proceed with continued therapy and look into more massage therapy. I will also look into trigger point injections for her as well.    Should the patient's symptoms worsen, persist, or fail to improve they should return for reevaluation and I would be happy to see them back anytime.           Please do not hesitate to reach out to us via email, phone, or MyChart with any questions, concerns, or feedback.

## 2023-10-12 ENCOUNTER — HOSPITAL ENCOUNTER (OUTPATIENT)
Facility: OTHER | Age: 53
Discharge: HOME | End: 2023-10-12
Attending: ANESTHESIOLOGY | Admitting: ANESTHESIOLOGY
Payer: OTHER GOVERNMENT

## 2023-10-12 VITALS
BODY MASS INDEX: 26.43 KG/M2 | WEIGHT: 140 LBS | DIASTOLIC BLOOD PRESSURE: 67 MMHG | TEMPERATURE: 98 F | SYSTOLIC BLOOD PRESSURE: 108 MMHG | HEART RATE: 55 BPM | HEIGHT: 61 IN | OXYGEN SATURATION: 97 % | RESPIRATION RATE: 16 BRPM

## 2023-10-12 DIAGNOSIS — G89.29 CHRONIC PAIN: ICD-10-CM

## 2023-10-12 DIAGNOSIS — M16.12 OSTEOARTHRITIS OF LEFT HIP, UNSPECIFIED OSTEOARTHRITIS TYPE: Primary | ICD-10-CM

## 2023-10-12 PROCEDURE — 20610 DRAIN/INJ JOINT/BURSA W/O US: CPT | Mod: LT | Performed by: ANESTHESIOLOGY

## 2023-10-12 PROCEDURE — 20610 DRAIN/INJ JOINT/BURSA W/O US: CPT | Mod: LT,,, | Performed by: ANESTHESIOLOGY

## 2023-10-12 PROCEDURE — 20610 PR DRAIN/INJECT LARGE JOINT/BURSA: ICD-10-PCS | Mod: LT,,, | Performed by: ANESTHESIOLOGY

## 2023-10-12 PROCEDURE — 25000003 PHARM REV CODE 250: Performed by: ANESTHESIOLOGY

## 2023-10-12 PROCEDURE — 63600175 PHARM REV CODE 636 W HCPCS: Performed by: ANESTHESIOLOGY

## 2023-10-12 PROCEDURE — 25500020 PHARM REV CODE 255: Performed by: ANESTHESIOLOGY

## 2023-10-12 RX ORDER — BUPIVACAINE HYDROCHLORIDE 2.5 MG/ML
INJECTION, SOLUTION EPIDURAL; INFILTRATION; INTRACAUDAL
Status: DISCONTINUED | OUTPATIENT
Start: 2023-10-12 | End: 2023-10-12 | Stop reason: HOSPADM

## 2023-10-12 RX ORDER — TRIAMCINOLONE ACETONIDE 40 MG/ML
INJECTION, SUSPENSION INTRA-ARTICULAR; INTRAMUSCULAR
Status: DISCONTINUED | OUTPATIENT
Start: 2023-10-12 | End: 2023-10-12 | Stop reason: HOSPADM

## 2023-10-12 RX ORDER — SODIUM CHLORIDE 9 MG/ML
INJECTION, SOLUTION INTRAVENOUS CONTINUOUS
Status: DISCONTINUED | OUTPATIENT
Start: 2023-10-12 | End: 2023-10-12 | Stop reason: HOSPADM

## 2023-10-12 RX ORDER — LIDOCAINE HYDROCHLORIDE 20 MG/ML
INJECTION, SOLUTION INFILTRATION; PERINEURAL
Status: DISCONTINUED | OUTPATIENT
Start: 2023-10-12 | End: 2023-10-12 | Stop reason: HOSPADM

## 2023-10-12 NOTE — H&P
"HPI  Patient presenting for Procedure(s) (LRB):  INJECTION, LEFT HIP INTRA-ARTICULAR (Left)     Patient on Anti-coagulation No    No health changes since previous encounter    History reviewed. No pertinent past medical history.  History reviewed. No pertinent surgical history.  Review of patient's allergies indicates:   Allergen Reactions    House dust mite Anxiety, Itching, Swelling and Tinitus      Current Facility-Administered Medications   Medication    0.9%  NaCl infusion    BUPivacaine (PF) 0.25% (2.5 mg/ml) injection    iohexoL (OMNIPAQUE 300) injection    LIDOcaine HCL 20 mg/ml (2%) injection    triamcinolone acetonide injection       PMHx, PSHx, Allergies, Medications reviewed in epic    ROS negative except pain complaints in HPI    OBJECTIVE:    /76 (BP Location: Right arm, Patient Position: Lying)   Pulse 65   Temp 98.4 °F (36.9 °C) (Oral)   Resp 16   Ht 5' 1" (1.549 m)   Wt 63.5 kg (140 lb)   SpO2 (!) 94%   BMI 26.45 kg/m²     PHYSICAL EXAMINATION:    GENERAL: Well appearing, in no acute distress, alert and oriented x3.  PSYCH:  Mood and affect appropriate.  SKIN: Skin color, texture, turgor normal, no rashes or lesions which will impact the procedure.  CV: RRR with palpation of the radial artery.  PULM: No evidence of respiratory difficulty, symmetric chest rise. Clear to auscultation.  NEURO: Cranial nerves grossly intact.    Plan:    Proceed with procedure as planned Procedure(s) (LRB):  INJECTION, LEFT HIP INTRA-ARTICULAR (Left)    Dennis Abdullahi  10/12/2023            "

## 2023-10-12 NOTE — DISCHARGE SUMMARY
Discharge Note  Short Stay      SUMMARY     Admit Date: 10/12/2023    Attending Physician: Dennis Abdullahi      Discharge Physician: Dennis Abdullahi      Discharge Date: 10/12/2023 11:06 AM    Procedure(s) (LRB):  INJECTION, LEFT HIP INTRA-ARTICULAR (Left)    Final Diagnosis: Pain of left hip [M25.552]    Disposition: Home or self care    Patient Instructions:   Current Discharge Medication List        CONTINUE these medications which have NOT CHANGED    Details   acyclovir 5% (ZOVIRAX) 5 % Crea Apply topically 5 (five) times daily.      azelastine (ASTELIN) 137 mcg (0.1 %) nasal spray 2 sprays (274 mcg total) by Nasal route 2 (two) times daily.  Qty: 30 mL, Refills: 3    Associated Diagnoses: Other rhinitis; Nasal congestion      budesonide (PULMICORT) 0.5 mg/2 mL nebulizer solution Take 2 mLs (0.5 mg total) by nebulization once daily. Controller  Qty: 120 mL, Refills: 3    Associated Diagnoses: Rhinosinusitis      celecoxib (CELEBREX) 200 MG capsule Take 1 capsule (200 mg total) by mouth 2 (two) times daily.  Qty: 30 capsule, Refills: 2    Associated Diagnoses: Patellofemoral pain syndrome of both knees      fluticasone propionate (FLONASE) 50 mcg/actuation nasal spray 100 mcg by Nasal route.      ibuprofen (ADVIL,MOTRIN) 800 MG tablet Take 800 mg by mouth 3 (three) times daily.      olopatadine (PATANOL) 0.1 % ophthalmic solution       valACYclovir (VALTREX) 1000 MG tablet Take 4,000 mg by mouth once.                 Discharge Diagnosis: Pain of left hip [M25.552]  Condition on Discharge: Stable with no complications to procedure   Diet on Discharge: Same as before.  Activity: as per instruction sheet.  Discharge to: Home with a responsible adult.  Follow up: 2-4 weeks       Please call my office or pager at [phone] if experienced any weakness or loss of sensation, fever > 101.5, pain uncontrolled with oral medications, persistent nausea/vomiting/or diarrhea, redness or drainage from the incisions, or any other  worrisome concerns. If physician on call was not reached or could not communicate with our office for any reason please go to the nearest emergency department

## 2023-10-12 NOTE — DISCHARGE INSTRUCTIONS

## 2023-10-12 NOTE — OP NOTE
Hip Joint Injection under Fluoroscopic Guidance    The procedure, risks, benefits, and options were discussed with the patient. There are no contraindications to the procedure. The patent expressed understanding and agreed to the procedure. Informed written consent was obtained prior to the start of the procedure and can be found in the patient's chart.    PATIENT NAME: Johana Lennon   MRN: [id number]     DATE OF PROCEDURE: 10/12/2023    PROCEDURE: Left Hip Joint Injection under Fluoroscopic Guidance    PRE-OP DIAGNOSIS: Pain of left hip [M25.552]    POST-OP DIAGNOSIS: Pain of left hip [M25.552]    PHYSICIAN: Beth Albright MD    ASSISTANTS: none     MEDICATIONS INJECTED: Preservative-free Kenalog 40mg with 3cc of Bupivacine 0.25%     LOCAL ANESTHETIC INJECTED: Xylocaine 2%     SEDATION: none    ESTIMATED BLOOD LOSS: None    COMPLICATIONS: None    TECHNIQUE: Time-out was performed to identify the patient and procedure to be performed. With the patient laying in a supine position, the surgical area was prepped and draped in the usual sterile fashion using ChloraPrep and a fenestrated drape. The area overlying the hip joint was determined under fluoroscopy guidance. Skin anesthesia was achieved by injecting Lidocaine 2% over the injection site. A 25 gauge, 3.5 inch spinal quinke needle was introduced under fluoroscopy until the tip reached the greater trochanter. The tip of the needle was hinged cephalad from the greater trochanter into the joint space.  When the needle tip was in the appropriate position, and there was no blood aspiration, Contrast dye  Omnipaque (300mg/mL) was injected to confirm placement and there was no vascular runoff. 4 mL of the medication mixture listed above was injected slowly. The needles were removed and bleeding was nil. A sterile dressing was applied. No specimens collected. The patient tolerated the procedure well.    PRE-PROCEDURE PAIN SCORE: 8/10    POST-PROCEDURE PAIN SCORE:  0/10    The patient was monitored after the procedure in the recovery area. They were given post-procedure and discharge instructions to follow at home. The patient was discharged in a stable condition.    Dennis Abdullahi MD    I reviewed and edited the fellow's note. I conducted my own interview and physical examination. I agree with the findings. I was present and supervising all critical portions of the procedure.

## 2024-05-06 ENCOUNTER — OFFICE VISIT (OUTPATIENT)
Dept: PODIATRY | Facility: CLINIC | Age: 54
End: 2024-05-06
Payer: OTHER GOVERNMENT

## 2024-05-06 VITALS
WEIGHT: 138 LBS | HEART RATE: 57 BPM | HEIGHT: 61 IN | BODY MASS INDEX: 26.06 KG/M2 | SYSTOLIC BLOOD PRESSURE: 116 MMHG | DIASTOLIC BLOOD PRESSURE: 76 MMHG

## 2024-05-06 DIAGNOSIS — M25.571 PAIN IN JOINT INVOLVING RIGHT ANKLE AND FOOT: ICD-10-CM

## 2024-05-06 DIAGNOSIS — M24.471 CHRONIC OR RECURRENT SUBLUXATION OF PERONEAL TENDON OF RIGHT FOOT: ICD-10-CM

## 2024-05-06 DIAGNOSIS — M76.71 TENDINITIS OF RIGHT PERONEUS BREVIS TENDON: Primary | ICD-10-CM

## 2024-05-06 PROCEDURE — 99214 OFFICE O/P EST MOD 30 MIN: CPT | Mod: PBBFAC | Performed by: PODIATRIST

## 2024-05-06 PROCEDURE — 99213 OFFICE O/P EST LOW 20 MIN: CPT | Mod: S$PBB,,, | Performed by: PODIATRIST

## 2024-05-06 PROCEDURE — 99999 PR PBB SHADOW E&M-EST. PATIENT-LVL IV: CPT | Mod: PBBFAC,,, | Performed by: PODIATRIST

## 2024-05-06 NOTE — PROGRESS NOTES
"Subjective:     Patient ID: Johana Lennon is a 53 y.o. female.    Chief Complaint: Ankle Pain (Right weakness/Left foot with stiffness )    Johana is a 53 y.o. female who presents to the podiatry clinic with CC of R ankle clicking and discomfort but mostly weakness. Has hx of multiple sprains over past many years. Goes to spin class often. Wanted to have this looked at because it seems to be getting worse.     Vitals:    05/06/24 1433   BP: 116/76   Pulse: (!) 57   Weight: 62.6 kg (138 lb)   Height: 5' 1" (1.549 m)   PainSc: 0-No pain       Review of Systems   Constitutional: Negative for chills and fever.   Cardiovascular:  Negative for chest pain, claudication and leg swelling.   Respiratory:  Negative for cough and shortness of breath.    Skin:  Negative for dry skin and nail changes.   Musculoskeletal:  Positive for arthritis, back pain, joint pain and muscle weakness (left foot weakness).   Gastrointestinal:  Negative for nausea and vomiting.   Neurological:  Positive for numbness, paresthesias and sensory change.   Psychiatric/Behavioral:  Negative for altered mental status.         Objective:     Physical Exam  Vitals reviewed.   Constitutional:       Appearance: She is well-developed.   HENT:      Head: Normocephalic.   Cardiovascular:      Pulses:           Dorsalis pedis pulses are 2+ on the right side and 2+ on the left side.        Posterior tibial pulses are 2+ on the right side and 2+ on the left side.      Comments: DP and PT pulses are 2/4 bilaterally.        Pulmonary:      Effort: No respiratory distress.   Musculoskeletal:      Right lower leg: No edema.      Left lower leg: No edema.      Comments: Mild pain with palpation of R peroneus brevis/longus complex posterior to fibular groove. No palpable CFL ligament (possible old tear/injury). Mild pain on inversion/eversion ROM of R ankle along PB/PL complex. Mild displacement out of fibular groove with ROM. Adequate strength with pain on PF/DF and " eversion resistance R ankle.    Skin:     General: Skin is warm and dry.      Findings: No erythema.      Comments: No open lesions, lacerations or wounds noted. Nails are normal to R 1-5 and L 1-5. Interdigital spaces clean, dry and intact b/l. No erythema noted to b/l foot. Skin texture normal. Pedal hair normal     Neurological:      Mental Status: She is alert and oriented to person, place, and time.      Sensory: Sensory deficit present.      Comments: Light touch, proprioception, and sharp/dull sensation are all intact bilaterally. Subjective paresthesias with no clearly identifiable source or trigger.    Psychiatric:         Behavior: Behavior normal.         Thought Content: Thought content normal.         Judgment: Judgment normal.           Assessment:      Encounter Diagnoses   Name Primary?    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    Diagnoses and all orders for this visit:    Tendinitis of right peroneus brevis tendon  -     Ambulatory referral/consult to Physical/Occupational Therapy; Future    Chronic or recurrent subluxation of peroneal tendon of right foot  -     Ambulatory referral/consult to Physical/Occupational Therapy; Future    Pain in joint involving right ankle and foot  -     Ambulatory referral/consult to Physical/Occupational Therapy; Future  -     MRI Ankle Without Contrast Right; Future        I counseled the patient on her conditions, their implications and medical management.    Previous xrays negative.     MRI ordered of R ankle to assess soft tissues for injury and/or damage given equivocal xray results. Rule out soft tissue abnormality or PB/PL complex issue.    Will order PT for further assessment of R lateral ankle pain and weakness. See referral.      Long discussion with patient regarding  appropriate, supportive and comfortable shoes. Recommended supportive style shoe brands with adequate arch supports to alleviate abnormal pressure and improve stability of foot while walking. Avoid flat shoes and barefoot walking as these will exacerbate or worsen symptoms.     Consider ankle brace    RTC within 1 week after MRI, sooner PRN

## 2024-05-20 ENCOUNTER — TELEPHONE (OUTPATIENT)
Dept: PODIATRY | Facility: CLINIC | Age: 54
End: 2024-05-20
Payer: OTHER GOVERNMENT

## 2024-05-20 NOTE — TELEPHONE ENCOUNTER
Spoke with pt in regards to 6/6 appt needing to be r/s due to provider not being in clinic. Pt r/s and verbalized understanding.

## 2024-05-28 ENCOUNTER — CLINICAL SUPPORT (OUTPATIENT)
Dept: REHABILITATION | Facility: HOSPITAL | Age: 54
End: 2024-05-28
Attending: PODIATRIST
Payer: OTHER GOVERNMENT

## 2024-05-28 DIAGNOSIS — R52 PAIN: Primary | ICD-10-CM

## 2024-05-28 DIAGNOSIS — M24.471 CHRONIC OR RECURRENT SUBLUXATION OF PERONEAL TENDON OF RIGHT FOOT: ICD-10-CM

## 2024-05-28 DIAGNOSIS — M76.71 TENDINITIS OF RIGHT PERONEUS BREVIS TENDON: ICD-10-CM

## 2024-05-28 DIAGNOSIS — M25.571 PAIN IN JOINT INVOLVING RIGHT ANKLE AND FOOT: ICD-10-CM

## 2024-05-28 PROCEDURE — 97110 THERAPEUTIC EXERCISES: CPT | Performed by: PHYSICAL THERAPIST

## 2024-05-28 PROCEDURE — 97161 PT EVAL LOW COMPLEX 20 MIN: CPT | Performed by: PHYSICAL THERAPIST

## 2024-05-28 NOTE — PLAN OF CARE
[facility]   Physical Therapy Initial Evaluation      Name: Johana Lennon  Clinic Number: [id number]    Therapy Diagnosis:   Encounter Diagnoses   Name Primary?    Tendinitis of right peroneus brevis tendon     Chronic or recurrent subluxation of peroneal tendon of right foot     Pain in joint involving right ankle and foot     Pain Yes        Physician: Mark Rodriguez DPM    Physician Orders: PT Eval and Treat   Medical Diagnosis from Referral: M76.71 (ICD-10-CM) - Tendinitis of right peroneus brevis tendon M24.471 (ICD-10-CM) - Chronic or recurrent subluxation of peroneal tendon of right foot M25.571 (ICD-10-CM) - Pain in joint involving right ankle and foot  Evaluation Date: 5/28/2024  Authorization Period Expiration: 9-11-24  Plan of Care Expiration: 7-31-24  Visit # / Visits authorized: 1/ 1   FOTO: 1/5    Precautions: Standard     Time In: 2:20 pm  Time Out: 3:00 pm  Total Appointment Time (timed & untimed codes): 35 minutes    Subjective     Date of onset: 6 mo ago  History of current condition - Johana reports: having lateral R ankle pain with weakness secondary to multiple ankle sprains, inversion MICHELLE, over the years.  States she ran for 30 min today without pain.  States she wants to learn exercises to strengthen her ankle to allow for improved ADL.  Pt feels pain limits her ADL.       No past medical history on file.  Johana Lennon  has a past surgical history that includes injection (Left, 10/12/2023).    Johana has a current medication list which includes the following prescription(s): acyclovir 5%, azelastine, budesonide, celecoxib, fluticasone propionate, ibuprofen, olopatadine, and valacyclovir.    Review of patient's allergies indicates:   Allergen Reactions    House dust mite Anxiety, Itching, Swelling and Tinitus        Imaging: none    Prior Therapy: na  Social History: na lives alone  Occupation: [employer]  Prior Level of Function: spin class; jogging  Current  Level of Function: ADL limited     Pain:  Current 0/10, worst 3/10, best 0/10   Location: right ankle  Description: Aching and Dull  Aggravating Factors: Walking  Easing Factors: rest    Pts goals: pain-free ADL    Objective     Postural examination:  fair arch height     Functional assessment:   - walking:   independent             AROM:  0 deg DF, others WNL     MMT:   hip abductors 4-/5, ankle eversion 4/5, others 5/5    Tone:  normal    Flexibility testing:   tight heelcords    Special tests:   pain with lateral ankle stress test    Palpation:   TTP peroneal tendons and ATFL    Joint mobility: fair subtalar joint and forefoot    Swelling:  none    Other:  decreased balance in SLS    CMS Impairment/Limitation/Restriction for FOTO ankle Survey    Therapist reviewed FOTO scores for Johana Lennon on 5/28/2024.   FOTO documents entered into [employer] - see Media section.           TREATMENT     Treatment Time In: 2:20 pm  Treatment Time Out: 3:00 pm  Total Treatment time separate from Evaluation time:20 min    Treatment:  HEP    Home Exercises and Patient Education Provided    Education provided:   - ice for pain    Written Home Exercises Provided: yes.  Exercises were reviewed and Johana was able to demonstrate them prior to the end of the session.  Johana demonstrated good  understanding of the education provided.     See EMR under Media for exercises provided 5/28/2024.      Assessment     Johana is a 53 y.o. female referred to outpatient Physical Therapy with a medical diagnosis of M76.71 (ICD-10-CM) - Tendinitis of right peroneus brevis tendon M24.471 (ICD-10-CM) - Chronic or recurrent subluxation of peroneal tendon of right foot M25.571 (ICD-10-CM) - Pain in joint involving right ankle and foot.  Pt presents with R lateral ankle pain with weakness and decreased flexibility that limit ADL.    Pt prognosis is Good.   Pt will benefit from skilled outpatient Physical Therapy to address the deficits stated above and  in the chart below, provide pt/family education, and to maximize pt's level of independence.     Plan of care discussed with patient: Yes  Pt's spiritual, cultural and educational needs considered and patient is agreeable to the plan of care and goals as stated below:     Anticipated Barriers for therapy: multiple ankle sprains    Medical Necessity is demonstrated by the following  History  Co-morbidities and personal factors that may impact the plan of care [x] LOW: no personal factors / co-morbidities  [] MODERATE: 1-2 personal factors / co-morbidities  [] HIGH: 3+ personal factors / co-morbidities    Moderate / High Support Documentation:   Co-morbidities affecting plan of care: na    Personal Factors:   no deficits     Examination  Body Structures and Functions, activity limitations and participation restrictions that may impact the plan of care [x] LOW: addressing 1-2 elements  [] MODERATE: 3+ elements  [] HIGH: 4+ elements (please support below)    Moderate / High Support Documentation: na     Clinical Presentation [x] LOW: stable  [] MODERATE: Evolving  [] HIGH: Unstable     Decision Making/ Complexity Score: low       Goals:  Short Term Goals: 2 weeks         1.   Independent with HEP        2.  Pt will report decreased pain level of < 50% from above measure with ADL    Long Term Goals:   GOALS:    4_   weeks. Pt agrees with goals set.  Independent with HEP.  Report decreased    ankle pain    pain  <   / =  2/10 with ADL such as climbing stairs or prolonged walking  Increased MMT  for  R LE to 4+/5 to 5/5  with ADL such as jogging  Increased arom  for  ankle DF to 5 deg with functional activities such walking or self-care      Plan     Certification Period/Plan of care expiration: 5/28/2024 to 7-31-24.    Outpatient Physical Therapy 1 times weekly for 4 weeks to include the following interventions: Manual Therapy, Moist Heat/ Ice, Neuromuscular Re-ed, Patient Education, and Therapeutic Exercise.     Anthony SUAREZ  Britsch, PT

## 2024-05-28 NOTE — PATIENT INSTRUCTIONS
Ankle Pump    With affected leg elevated, gently flex and extend ankle. Move through full range of motion. Avoid pain. Perform more frequently if having increased swelling.  Repeat 25 times. Do 1 set per session. Do 2 sessions per day.      Ankle Alphabet    Sit with leg straight out in front of you and heel off edge of bed or propped up on towel roll. Using ankle and foot only, trace the letters of the alphabet. Perform A to Z. Do not let the knee move too much side to side.  Repeat 1 times on affected side Do 2 sessions per day.      Towel Scrunches    Place affected foot flat on towel, knee pointed forward. Use forefoot and toes to pull towel backward.  Do not allow heel or knee to move. Repetitions should be slow and controlled.  Repeat 25 times Do 1 set per session. Do 2 sessions per day.      Gastroc, Sitting (Passive)     Sit with leg straight out in front of you and a towel under your heel and around ball of foot. Gently pull toward body. Hold 30 seconds.   Repeat 5 times. Do 1 sets per session. Do 2 sessions per day.      Straight Leg Raise     With R/L or BL leg straight, other leg bent, raise straight leg until knees are even. Slowly lower. Roll on your side and repeat lifting top leg up, on other side, lifting bottom leg up, and on stomach kicking back (squeezing your rear end before you kick back).  Repeat 25 times. Do 1 sets per session. Do 2 sessions per day.       Glute Squeeze, supine    Lie face up and squeeze your rear end. Do not tighten your abdominals or hold your breath. Squeeze our glutes and hold 5 seconds. Relax.  Repeat 25 times per set. Do 1 sets per session. Do 2 sessions per day.        Strengthening: Hip Abductor - Resisted    Lie flat with band looped around both legs above knees, and push thighs apart, ensuring right and left move together.  Repeat 25 times per set. Do 1 sets per session. Do 2 sessions per day.      Clam Shells    Lie on side with knees bent. Raise top leg, keeping  knee bent and ankles together. Do not let your hips roll back as your raise your leg.  Repeat 25 times. Do 1 sets per session. Do 2 sessions per day.      Resistance Band Ankle Movements, 4 ways    1. Wrap band around foot and attach below the foot. Pull foot up against resistance band. Slowly release for 3-5 seconds.  2. Wrap band around foot and hold band in your hand. Push foot down against resistance band. Slowly release for 3-5 seconds.  3. Wrap band around foot and loop around other foot and hold the end of the band. Pull foot out to side against resistance band. Slowly release for 3-5 seconds.   4. Cross one leg over the other, wrap band around bottom foot, step top of foot on band and hold the end of the band. Pull foot to the inside against resistance band. Slowly release for 3-5 seconds.     Use  resistance band.  Repeat 25 times Do 1 set per session. Do 2 sessions per day.        Single Leg - Eyes Open    Holding support, lift non-affected leg while maintaining balance on other leg.  Use counter for support only as needed. Progress by closing eyes or standing on cushion. Hold 10 seconds. Repeat on other side.  Repeat 10 times per session. Do 2 sessions per day.      Calf Raise: (Standing)    Stand on both feet and rise on ball of foot. Do not let ankle roll out. Slowly return to start and repeat.  Repeat 25 times per set. Do 1 set per session. Do 2 sessions per day.

## 2024-05-30 ENCOUNTER — HOSPITAL ENCOUNTER (OUTPATIENT)
Dept: RADIOLOGY | Facility: HOSPITAL | Age: 54
Discharge: HOME | End: 2024-05-30
Attending: PODIATRIST
Payer: OTHER GOVERNMENT

## 2024-05-30 DIAGNOSIS — M25.571 PAIN IN JOINT INVOLVING RIGHT ANKLE AND FOOT: ICD-10-CM

## 2024-05-30 PROCEDURE — 73721 MRI JNT OF LWR EXTRE W/O DYE: CPT | Mod: 26,RT,, | Performed by: RADIOLOGY

## 2024-05-30 PROCEDURE — 73721 MRI JNT OF LWR EXTRE W/O DYE: CPT | Mod: TC,RT

## 2024-05-31 ENCOUNTER — OFFICE VISIT (OUTPATIENT)
Dept: SLEEP MEDICINE | Facility: CLINIC | Age: 54
End: 2024-05-31
Attending: PSYCHIATRY & NEUROLOGY
Payer: OTHER GOVERNMENT

## 2024-05-31 VITALS
DIASTOLIC BLOOD PRESSURE: 65 MMHG | HEART RATE: 64 BPM | SYSTOLIC BLOOD PRESSURE: 106 MMHG | BODY MASS INDEX: 26.93 KG/M2 | HEIGHT: 61 IN | WEIGHT: 142.63 LBS

## 2024-05-31 DIAGNOSIS — G47.33 OSA (OBSTRUCTIVE SLEEP APNEA): Primary | ICD-10-CM

## 2024-05-31 PROCEDURE — 99999 PR PBB SHADOW E&M-EST. PATIENT-LVL III: CPT | Mod: PBBFAC,,, | Performed by: NURSE PRACTITIONER

## 2024-05-31 PROCEDURE — 99214 OFFICE O/P EST MOD 30 MIN: CPT | Mod: S$PBB,,, | Performed by: NURSE PRACTITIONER

## 2024-05-31 PROCEDURE — 99213 OFFICE O/P EST LOW 20 MIN: CPT | Mod: PBBFAC | Performed by: NURSE PRACTITIONER

## 2024-05-31 NOTE — PROGRESS NOTES
Cc: HANK,new to me    She continues to sleep almost qhs with apap 5-12cm. Using hinson fx adele mask she says. Tried different ones/FFM in past. Sometimes congestion impacts use. Feels overall cpap is helping reduce daytime sleepiness and helping her live longer. Sleeps alone ESS=11 (4)  Denies cataplexy, vivid dreaming or H/H hallucinations. Remote sleep paralysis. Denies lifelong sleepiness    Remote- not able to view  Phone air[employer] gamal AHI<5. Avg 5-6.5hr        PSG 2021  AHI was 6.5 with an oxygen gem of 92.0%     Assessment:  HANK, mild. Continued adherence PAP, benefiting from therapy. AHI<5      Plan:  RX supplies DME THS/heated hose  Request to be added to [employer]  Continue apap 5-12cm  Discussed potential prn provigil  Rtc annually, sooner if needed    Addendum 6/6/24: rev'd compliance report 29/30d usd avg 4:38h/n AHI 0.8, 90 %tile 4.4cm ? Settings says are 5-6cm

## 2024-06-01 ENCOUNTER — PATIENT MESSAGE (OUTPATIENT)
Dept: SLEEP MEDICINE | Facility: CLINIC | Age: 54
End: 2024-06-01
Payer: OTHER GOVERNMENT

## 2024-06-03 ENCOUNTER — TELEPHONE (OUTPATIENT)
Dept: PODIATRY | Facility: CLINIC | Age: 54
End: 2024-06-03
Payer: OTHER GOVERNMENT

## 2024-06-03 NOTE — TELEPHONE ENCOUNTER
Spoke with patient in regards to her MRI results per: Dr. Rodriguez, patient verbally understood her test results and will F/U with Dr. Rodriguez on 06/11/2024 for further recommendations          ----- Message from Mark Rodriguez DPM sent at 6/3/2024 11:03 AM CDT -----  Please call her and let her know that her MRI shows an old fracture on her ankle that may not have healed properly and some tendinitis and arthritis in the foot. Continue with current plan and follow up as scheduled for further recommendations. Thanks.

## 2024-06-04 ENCOUNTER — PATIENT MESSAGE (OUTPATIENT)
Dept: SLEEP MEDICINE | Facility: CLINIC | Age: 54
End: 2024-06-04
Payer: OTHER GOVERNMENT

## 2024-06-05 ENCOUNTER — PATIENT MESSAGE (OUTPATIENT)
Dept: SLEEP MEDICINE | Facility: CLINIC | Age: 54
End: 2024-06-05
Payer: OTHER GOVERNMENT

## 2024-06-11 ENCOUNTER — OFFICE VISIT (OUTPATIENT)
Dept: PODIATRY | Facility: CLINIC | Age: 54
End: 2024-06-11
Payer: OTHER GOVERNMENT

## 2024-06-11 VITALS
WEIGHT: 137 LBS | SYSTOLIC BLOOD PRESSURE: 109 MMHG | BODY MASS INDEX: 25.86 KG/M2 | HEIGHT: 61 IN | HEART RATE: 60 BPM | DIASTOLIC BLOOD PRESSURE: 62 MMHG

## 2024-06-11 DIAGNOSIS — G89.29 CHRONIC PAIN OF RIGHT ANKLE: ICD-10-CM

## 2024-06-11 DIAGNOSIS — M25.571 CHRONIC PAIN OF RIGHT ANKLE: ICD-10-CM

## 2024-06-11 DIAGNOSIS — M76.71 PERONEAL TENDINITIS, RIGHT: ICD-10-CM

## 2024-06-11 DIAGNOSIS — S82.61XK AVULSION FRACTURE OF LATERAL MALLEOLUS OF RIGHT FIBULA, CLOSED, WITH NONUNION, SUBSEQUENT ENCOUNTER: Primary | ICD-10-CM

## 2024-06-11 PROCEDURE — 99213 OFFICE O/P EST LOW 20 MIN: CPT | Mod: PBBFAC | Performed by: PODIATRIST

## 2024-06-11 PROCEDURE — 99999 PR PBB SHADOW E&M-EST. PATIENT-LVL III: CPT | Mod: PBBFAC,,, | Performed by: PODIATRIST

## 2024-06-11 PROCEDURE — 99213 OFFICE O/P EST LOW 20 MIN: CPT | Mod: S$PBB,,, | Performed by: PODIATRIST

## 2024-06-11 NOTE — PROGRESS NOTES
"Subjective:     Patient ID: Johana Lennon is a 53 y.o. female.    Chief Complaint: Follow-up (Right ankle)    Johana is a 53 y.o. female who returns to clinic for follow up of R ankle clicking and discomfort and weakness. Has hx of multiple sprains over past many years. Goes to spin class often. 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 Has been going to PT and this has helped a lot. Problem began 2 + years ago. Here for further recommendations.     Vitals:    06/11/24 1426   BP: 109/62   Pulse: 60   Weight: 62.1 kg (137 lb)   Height: 5' 1" (1.549 m)   PainSc: 0-No pain       Review of Systems   Constitutional: Negative for chills and fever.   Cardiovascular:  Negative for chest pain, claudication and leg swelling.   Respiratory:  Negative for cough and shortness of breath.    Skin:  Negative for dry skin and nail changes.   Musculoskeletal:  Positive for arthritis, back pain, joint pain and muscle weakness (left foot weakness).   Gastrointestinal:  Negative for nausea and vomiting.   Neurological:  Positive for numbness, paresthesias and sensory change.   Psychiatric/Behavioral:  Negative for altered mental status.         Objective:     Physical Exam  Vitals reviewed.   Constitutional:       Appearance: She is well-developed.   HENT:      Head: Normocephalic.   Cardiovascular:      Pulses:           Dorsalis pedis pulses are 2+ on the right side and 2+ on the left side.        Posterior tibial pulses are 2+ on the right side and 2+ on the left side.      Comments: DP and PT pulses are 2/4 bilaterally.        Pulmonary:      Effort: No respiratory distress.   Musculoskeletal:      Right lower leg: No edema.      Left lower leg: No edema.      Comments: Mild pain with palpation of R peroneus brevis/longus complex posterior to fibular groove. No palpable CFL ligament (possible old tear/injury). Mild pain on inversion/eversion ROM of R " ankle along PB/PL complex. Mild displacement out of fibular groove with ROM. Adequate strength with pain on PF/DF and eversion resistance R ankle.    Skin:     General: Skin is warm and dry.      Findings: No erythema.      Comments: No open lesions, lacerations or wounds noted. Nails are normal to R 1-5 and L 1-5. Interdigital spaces clean, dry and intact b/l. No erythema noted to b/l foot. Skin texture normal. Pedal hair normal     Neurological:      Mental Status: She is alert and oriented to person, place, and time.      Sensory: Sensory deficit present.      Comments: Light touch, proprioception, and sharp/dull sensation are all intact bilaterally. Subjective paresthesias with no clearly identifiable source or trigger.    Psychiatric:         Behavior: Behavior normal.         Thought Content: Thought content normal.         Judgment: Judgment normal.           Assessment:      Encounter Diagnoses   Name Primary?    Avulsion fracture of lateral malleolus of right fibula, closed, with nonunion, subsequent encounter Yes    Chronic pain of right ankle     Peroneal tendinitis, right          Plan:     Johana was seen today for follow-up.    Diagnoses and all orders for this visit:    Avulsion fracture of lateral malleolus of right fibula, closed, with nonunion, subsequent encounter  -     Bone Stimulator for Home Use  -     HME - OTHER    Chronic pain of right ankle  -     HME - OTHER    Peroneal tendinitis, right  -     HME - OTHER          I counseled the patient on her conditions, their implications and medical management.    Previous xrays negative. MRI showed nonunion distal fibula and peroneal tendinitis.     Bone stimulator for R ankle. Ordered.     Continue PT as ordered.     Dispensed and applied ankle brace to affected side for added support of the joint and to decrease motion and improve pain and stability during painful times and during high impact activities or long distance walking/exercising, etc.  Advised to use at all times while ambulating.     Long discussion with patient regarding appropriate, supportive and comfortable shoes. Recommended supportive style shoe brands with adequate arch supports to alleviate abnormal pressure and improve stability of foot while walking. Avoid flat shoes and barefoot walking as these will exacerbate or worsen symptoms.     RTC within 6 weeks, sooner PRN

## 2024-09-26 ENCOUNTER — OFFICE VISIT (OUTPATIENT)
Dept: PODIATRY | Facility: CLINIC | Age: 54
End: 2024-09-26
Payer: OTHER GOVERNMENT

## 2024-09-26 VITALS
SYSTOLIC BLOOD PRESSURE: 116 MMHG | HEART RATE: 56 BPM | WEIGHT: 136.69 LBS | BODY MASS INDEX: 25.81 KG/M2 | DIASTOLIC BLOOD PRESSURE: 75 MMHG | HEIGHT: 61 IN

## 2024-09-26 DIAGNOSIS — G89.29 CHRONIC PAIN OF RIGHT ANKLE: ICD-10-CM

## 2024-09-26 DIAGNOSIS — S82.61XK AVULSION FRACTURE OF LATERAL MALLEOLUS OF RIGHT FIBULA, CLOSED, WITH NONUNION, SUBSEQUENT ENCOUNTER: Primary | ICD-10-CM

## 2024-09-26 DIAGNOSIS — M76.71 PERONEAL TENDINITIS, RIGHT: ICD-10-CM

## 2024-09-26 DIAGNOSIS — M25.571 CHRONIC PAIN OF RIGHT ANKLE: ICD-10-CM

## 2024-09-26 PROCEDURE — 99213 OFFICE O/P EST LOW 20 MIN: CPT | Mod: PBBFAC | Performed by: PODIATRIST

## 2024-09-26 PROCEDURE — 99999 PR PBB SHADOW E&M-EST. PATIENT-LVL III: CPT | Mod: PBBFAC,,, | Performed by: PODIATRIST

## 2024-09-26 PROCEDURE — 99214 OFFICE O/P EST MOD 30 MIN: CPT | Mod: S$PBB,,, | Performed by: PODIATRIST

## 2024-10-10 NOTE — PROGRESS NOTES
"Subjective:     Patient ID: Johana Lennon is a 53 y.o. female.    Chief Complaint: Ankle Pain (Right non-union fracture f/u per Dr Rodriguez )    Johana is a 53 y.o. female who returns to clinic for follow up of R ankle clicking and discomfort and weakness. Has hx of multiple sprains over past many years. Goes to spin class often. 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 Has been going to PT and this has helped a lot. Problem began 2 + years ago. Here for further recommendations.  Having very minimal to no discomfort at this point.  She is back to activity and shoe gear.    Vitals:    09/26/24 0958   BP: 116/75   Pulse: (!) 56   Weight: 62 kg (136 lb 11 oz)   Height: 5' 1" (1.549 m)   PainSc:   1   PainLoc: Foot       Review of Systems   Constitutional: Negative for chills and fever.   Cardiovascular:  Negative for chest pain, claudication and leg swelling.   Respiratory:  Negative for cough and shortness of breath.    Skin:  Negative for dry skin and nail changes.   Musculoskeletal:  Positive for arthritis, back pain, joint pain and muscle weakness (left foot weakness).   Gastrointestinal:  Negative for nausea and vomiting.   Neurological:  Positive for numbness, paresthesias and sensory change.   Psychiatric/Behavioral:  Negative for altered mental status.         Objective:     Physical Exam  Vitals reviewed.   Constitutional:       Appearance: She is well-developed.   HENT:      Head: Normocephalic.   Cardiovascular:      Pulses:           Dorsalis pedis pulses are 2+ on the right side and 2+ on the left side.        Posterior tibial pulses are 2+ on the right side and 2+ on the left side.      Comments: DP and PT pulses are 2/4 bilaterally.        Pulmonary:      Effort: No respiratory distress.   Musculoskeletal:      Right lower leg: No edema.      Left lower leg: No edema.      Comments: Mild pain with palpation of R peroneus " brevis/longus complex posterior to fibular groove. No palpable CFL ligament (possible old tear/injury). Mild pain on inversion/eversion ROM of R ankle along PB/PL complex. Mild displacement out of fibular groove with ROM. Adequate strength with pain on PF/DF and eversion resistance R ankle.    Skin:     General: Skin is warm and dry.      Findings: No erythema.      Comments: No open lesions, lacerations or wounds noted. Nails are normal to R 1-5 and L 1-5. Interdigital spaces clean, dry and intact b/l. No erythema noted to b/l foot. Skin texture normal. Pedal hair normal     Neurological:      Mental Status: She is alert and oriented to person, place, and time.      Sensory: Sensory deficit present.      Comments: Light touch, proprioception, and sharp/dull sensation are all intact bilaterally. Subjective paresthesias with no clearly identifiable source or trigger.    Psychiatric:         Behavior: Behavior normal.         Thought Content: Thought content normal.         Judgment: Judgment normal.           Assessment:      Encounter Diagnoses   Name Primary?    Avulsion fracture of lateral malleolus of right fibula, closed, with nonunion, subsequent encounter Yes    Chronic pain of right ankle     Peroneal tendinitis, right          Plan:     Johana was seen today for ankle pain.    Diagnoses and all orders for this visit:    Avulsion fracture of lateral malleolus of right fibula, closed, with nonunion, subsequent encounter    Chronic pain of right ankle    Peroneal tendinitis, right          I counseled the patient on her conditions, their implications and medical management.    Previous xrays negative. MRI showed nonunion distal fibula and peroneal tendinitis.     Discontinue bone stimulator this point.  Discontinue physical therapy.  Continue transition into activity and shoe gear.  Follow-up as needed.

## (undated) DEVICE — DRESSING LEUKOPLAST FLEX 1X3IN